# Patient Record
Sex: FEMALE | Race: WHITE | NOT HISPANIC OR LATINO | Employment: OTHER | URBAN - METROPOLITAN AREA
[De-identification: names, ages, dates, MRNs, and addresses within clinical notes are randomized per-mention and may not be internally consistent; named-entity substitution may affect disease eponyms.]

---

## 2023-03-06 ENCOUNTER — PATIENT OUTREACH (OUTPATIENT)
Dept: CARE COORDINATION | Facility: CLINIC | Age: 68
End: 2023-03-06
Payer: MEDICARE

## 2023-03-06 DIAGNOSIS — N20.0 KIDNEY STONES: ICD-10-CM

## 2023-03-06 NOTE — PROGRESS NOTES
Admission Reason: Kidney stone   Final Discharge Diagnoses: Kidney stone Moderate malnutrition related to chronic disease or condition

## 2023-03-07 ENCOUNTER — PATIENT OUTREACH (OUTPATIENT)
Dept: CARE COORDINATION | Facility: CLINIC | Age: 68
End: 2023-03-07
Payer: MEDICARE

## 2023-03-14 LAB
ANION GAP IN SER/PLAS: 9 MMOL/L (ref 10–20)
BASOPHILS (10*3/UL) IN BLOOD BY AUTOMATED COUNT: 0.02 X10E9/L (ref 0–0.1)
BASOPHILS/100 LEUKOCYTES IN BLOOD BY AUTOMATED COUNT: 0.5 % (ref 0–2)
C REACTIVE PROTEIN (MG/L) IN SER/PLAS: 0.43 MG/DL
C. DIFFICILE TOXIN, PCR: NOT DETECTED
CALCIUM (MG/DL) IN SER/PLAS: 9 MG/DL (ref 8.6–10.3)
CARBON DIOXIDE, TOTAL (MMOL/L) IN SER/PLAS: 31 MMOL/L (ref 21–32)
CHLORIDE (MMOL/L) IN SER/PLAS: 103 MMOL/L (ref 98–107)
CREATININE (MG/DL) IN SER/PLAS: 0.46 MG/DL (ref 0.5–1.05)
EOSINOPHILS (10*3/UL) IN BLOOD BY AUTOMATED COUNT: 0.03 X10E9/L (ref 0–0.7)
EOSINOPHILS/100 LEUKOCYTES IN BLOOD BY AUTOMATED COUNT: 0.7 % (ref 0–6)
ERYTHROCYTE DISTRIBUTION WIDTH (RATIO) BY AUTOMATED COUNT: 12 % (ref 11.5–14.5)
ERYTHROCYTE MEAN CORPUSCULAR HEMOGLOBIN CONCENTRATION (G/DL) BY AUTOMATED: 31.9 G/DL (ref 32–36)
ERYTHROCYTE MEAN CORPUSCULAR VOLUME (FL) BY AUTOMATED COUNT: 99 FL (ref 80–100)
ERYTHROCYTES (10*6/UL) IN BLOOD BY AUTOMATED COUNT: 3.52 X10E12/L (ref 4–5.2)
GFR FEMALE: >90 ML/MIN/1.73M2
GLUCOSE (MG/DL) IN SER/PLAS: 84 MG/DL (ref 74–99)
HEMATOCRIT (%) IN BLOOD BY AUTOMATED COUNT: 34.8 % (ref 36–46)
HEMOGLOBIN (G/DL) IN BLOOD: 11.1 G/DL (ref 12–16)
IMMATURE GRANULOCYTES/100 LEUKOCYTES IN BLOOD BY AUTOMATED COUNT: 0.5 % (ref 0–0.9)
LEUKOCYTES (10*3/UL) IN BLOOD BY AUTOMATED COUNT: 4.3 X10E9/L (ref 4.4–11.3)
LYMPHOCYTES (10*3/UL) IN BLOOD BY AUTOMATED COUNT: 1.38 X10E9/L (ref 1.2–4.8)
LYMPHOCYTES/100 LEUKOCYTES IN BLOOD BY AUTOMATED COUNT: 32.2 % (ref 13–44)
MONOCYTES (10*3/UL) IN BLOOD BY AUTOMATED COUNT: 0.55 X10E9/L (ref 0.1–1)
MONOCYTES/100 LEUKOCYTES IN BLOOD BY AUTOMATED COUNT: 12.9 % (ref 2–10)
NEUTROPHILS (10*3/UL) IN BLOOD BY AUTOMATED COUNT: 2.28 X10E9/L (ref 1.2–7.7)
NEUTROPHILS/100 LEUKOCYTES IN BLOOD BY AUTOMATED COUNT: 53.2 % (ref 40–80)
NRBC (PER 100 WBCS) BY AUTOMATED COUNT: 0 /100 WBC (ref 0–0)
PLATELETS (10*3/UL) IN BLOOD AUTOMATED COUNT: 188 X10E9/L (ref 150–450)
POTASSIUM (MMOL/L) IN SER/PLAS: 3.4 MMOL/L (ref 3.5–5.3)
SEDIMENTATION RATE, ERYTHROCYTE: 9 MM/H (ref 0–30)
SODIUM (MMOL/L) IN SER/PLAS: 140 MMOL/L (ref 136–145)
STOOL CULTURE, TEST OF CURE: NORMAL
UREA NITROGEN (MG/DL) IN SER/PLAS: 16 MG/DL (ref 6–23)

## 2023-03-15 LAB
CAMPYLOBACTER GP: NOT DETECTED
NOROVIRUS GI/GII: NOT DETECTED
ROTAVIRUS A: NOT DETECTED
SALMONELLA SP.: NOT DETECTED
SHIGA TOXIN 1: NOT DETECTED
SHIGA TOXIN 2: NOT DETECTED
SHIGELLA SP.: NOT DETECTED
VIBRIO GRP.: NOT DETECTED
YERSINIA ENTEROCOLITICA: NOT DETECTED

## 2023-03-20 LAB
CRYPTOSPORIDIUM ANTIGEN-DATA CONVERSION: NEGATIVE
GIARDIA LAMBLIA AG-DATA CONVERSION: NEGATIVE
OVA + PARASITE EXAM: NEGATIVE

## 2023-05-12 ENCOUNTER — PATIENT OUTREACH (OUTPATIENT)
Dept: CARE COORDINATION | Facility: CLINIC | Age: 68
End: 2023-05-12
Payer: MEDICARE

## 2023-06-01 LAB
ANION GAP IN SER/PLAS: 9 MMOL/L (ref 10–20)
CALCIUM (MG/DL) IN SER/PLAS: 8.9 MG/DL (ref 8.6–10.3)
CARBON DIOXIDE, TOTAL (MMOL/L) IN SER/PLAS: 30 MMOL/L (ref 21–32)
CHLORIDE (MMOL/L) IN SER/PLAS: 103 MMOL/L (ref 98–107)
CREATININE (MG/DL) IN SER/PLAS: 0.45 MG/DL (ref 0.5–1.05)
GFR FEMALE: >90 ML/MIN/1.73M2
GLUCOSE (MG/DL) IN SER/PLAS: 81 MG/DL (ref 74–99)
POTASSIUM (MMOL/L) IN SER/PLAS: 3.9 MMOL/L (ref 3.5–5.3)
SODIUM (MMOL/L) IN SER/PLAS: 138 MMOL/L (ref 136–145)
UREA NITROGEN (MG/DL) IN SER/PLAS: 19 MG/DL (ref 6–23)

## 2023-06-15 LAB
AMPHETAMINE (PRESENCE) IN URINE BY SCREEN METHOD: ABNORMAL
BARBITURATES PRESENCE IN URINE BY SCREEN METHOD: ABNORMAL
BENZODIAZEPINE (PRESENCE) IN URINE BY SCREEN METHOD: ABNORMAL
CANNABINOIDS IN URINE BY SCREEN METHOD: ABNORMAL
COCAINE (PRESENCE) IN URINE BY SCREEN METHOD: ABNORMAL
DRUG SCREEN COMMENT URINE: ABNORMAL
FENTANYL URINE: ABNORMAL
METHADONE (PRESENCE) IN URINE BY SCREEN METHOD: ABNORMAL
OPIATES (PRESENCE) IN URINE BY SCREEN METHOD: ABNORMAL
OXYCODONE (PRESENCE) IN URINE BY SCREEN METHOD: ABNORMAL
PHENCYCLIDINE (PRESENCE) IN URINE BY SCREEN METHOD: ABNORMAL

## 2023-06-19 LAB
7-AMINOCLONAZEPAM: <25 NG/ML
ALPHA-HYDROXYALPRAZOLAM: <25 NG/ML
ALPHA-HYDROXYMIDAZOLAM: <25 NG/ML
ALPRAZOLAM: <25 NG/ML
CHLORDIAZEPOXIDE: <25 NG/ML
CLONAZEPAM: <25 NG/ML
DIAZEPAM: <25 NG/ML
LORAZEPAM: <25 NG/ML
MIDAZOLAM: <25 NG/ML
NORDIAZEPAM: >1000 NG/ML
OXAZEPAM: >1000 NG/ML
TEMAZEPAM: >1000 NG/ML

## 2023-10-03 ENCOUNTER — LAB (OUTPATIENT)
Dept: LAB | Facility: LAB | Age: 68
End: 2023-10-03
Payer: MEDICARE

## 2023-10-03 DIAGNOSIS — K52.832 LYMPHOCYTIC COLITIS: Primary | ICD-10-CM

## 2023-10-03 LAB
ALBUMIN SERPL BCP-MCNC: 4 G/DL (ref 3.4–5)
ALP SERPL-CCNC: 79 U/L (ref 33–136)
ALT SERPL W P-5'-P-CCNC: 10 U/L (ref 7–45)
ANION GAP SERPL CALC-SCNC: 8 MMOL/L (ref 10–20)
AST SERPL W P-5'-P-CCNC: 19 U/L (ref 9–39)
BILIRUB SERPL-MCNC: 0.5 MG/DL (ref 0–1.2)
BUN SERPL-MCNC: 17 MG/DL (ref 6–23)
CALCIUM SERPL-MCNC: 9 MG/DL (ref 8.6–10.3)
CHLORIDE SERPL-SCNC: 102 MMOL/L (ref 98–107)
CO2 SERPL-SCNC: 33 MMOL/L (ref 21–32)
CREAT SERPL-MCNC: 0.56 MG/DL (ref 0.5–1.05)
CRP SERPL-MCNC: 0.17 MG/DL
ERYTHROCYTE [DISTWIDTH] IN BLOOD BY AUTOMATED COUNT: 12.7 % (ref 11.5–14.5)
ERYTHROCYTE [SEDIMENTATION RATE] IN BLOOD BY WESTERGREN METHOD: 32 MM/H (ref 0–30)
GFR SERPL CREATININE-BSD FRML MDRD: >90 ML/MIN/1.73M*2
GLUCOSE SERPL-MCNC: 82 MG/DL (ref 74–99)
HCT VFR BLD AUTO: 34.9 % (ref 36–46)
HGB BLD-MCNC: 11 G/DL (ref 12–16)
MCH RBC QN AUTO: 31.4 PG (ref 26–34)
MCHC RBC AUTO-ENTMCNC: 31.5 G/DL (ref 32–36)
MCV RBC AUTO: 100 FL (ref 80–100)
NRBC BLD-RTO: 0 /100 WBCS (ref 0–0)
PLATELET # BLD AUTO: 165 X10*3/UL (ref 150–450)
PMV BLD AUTO: 9.5 FL (ref 7.5–11.5)
POTASSIUM SERPL-SCNC: 4.1 MMOL/L (ref 3.5–5.3)
PROT SERPL-MCNC: 6.9 G/DL (ref 6.4–8.2)
RBC # BLD AUTO: 3.5 X10*6/UL (ref 4–5.2)
SODIUM SERPL-SCNC: 139 MMOL/L (ref 136–145)
WBC # BLD AUTO: 4.1 X10*3/UL (ref 4.4–11.3)

## 2023-10-03 PROCEDURE — 36415 COLL VENOUS BLD VENIPUNCTURE: CPT

## 2023-10-30 PROBLEM — R76.0 ANTINUCLEAR ANTIBODY (ANA) TITER GREATER THAN 1:80: Status: ACTIVE | Noted: 2023-10-30

## 2023-10-30 PROBLEM — M70.22 OLECRANON BURSITIS OF LEFT ELBOW: Status: ACTIVE | Noted: 2023-10-30

## 2023-10-30 PROBLEM — N89.8 VAGINAL LESION: Status: ACTIVE | Noted: 2023-10-30

## 2023-10-30 PROBLEM — M54.30 CHRONIC SCIATICA: Status: ACTIVE | Noted: 2023-10-30

## 2023-10-30 PROBLEM — H00.022 HORDEOLUM INTERNUM OF RIGHT LOWER EYELID: Status: ACTIVE | Noted: 2023-10-30

## 2023-10-30 PROBLEM — K58.0 IRRITABLE BOWEL SYNDROME WITH DIARRHEA: Status: ACTIVE | Noted: 2023-10-30

## 2023-10-30 PROBLEM — F41.1 GAD (GENERALIZED ANXIETY DISORDER): Status: RESOLVED | Noted: 2023-10-30 | Resolved: 2023-10-30

## 2023-10-30 PROBLEM — H52.203 ASTIGMATISM, BILATERAL: Status: ACTIVE | Noted: 2023-10-30

## 2023-10-30 PROBLEM — H52.4 PRESBYOPIA OF BOTH EYES: Status: ACTIVE | Noted: 2023-10-30

## 2023-10-30 PROBLEM — J18.9 MULTIFOCAL PNEUMONIA: Status: ACTIVE | Noted: 2023-10-30

## 2023-10-30 PROBLEM — H43.811 PVD (POSTERIOR VITREOUS DETACHMENT), RIGHT EYE: Status: ACTIVE | Noted: 2023-10-30

## 2023-10-30 PROBLEM — R79.89 LOW SERUM CORTISOL LEVEL: Status: ACTIVE | Noted: 2023-10-30

## 2023-10-30 PROBLEM — H25.10 NUCLEAR SCLEROSIS: Status: ACTIVE | Noted: 2023-10-30

## 2023-10-30 PROBLEM — E78.49 HYPERLIPIDEMIA, FAMILIAL, HIGH LDL: Status: ACTIVE | Noted: 2023-10-30

## 2023-10-30 PROBLEM — F51.01 PRIMARY INSOMNIA: Status: ACTIVE | Noted: 2023-10-30

## 2023-10-30 PROBLEM — F40.248 SITUATIONAL PHOBIA: Status: ACTIVE | Noted: 2023-10-30

## 2023-10-30 PROBLEM — H52.00 HYPEROPIA: Status: ACTIVE | Noted: 2023-10-30

## 2023-10-30 PROBLEM — J47.9 BRONCHIECTASIS (MULTI): Status: ACTIVE | Noted: 2023-10-30

## 2023-10-30 PROBLEM — F41.9 ANXIETY: Status: ACTIVE | Noted: 2023-10-30

## 2023-10-30 PROBLEM — R05.3 CHRONIC COUGH: Status: ACTIVE | Noted: 2023-10-30

## 2023-10-30 PROBLEM — E55.9 VITAMIN D INSUFFICIENCY: Status: ACTIVE | Noted: 2023-10-30

## 2023-10-30 PROBLEM — K52.9 CHRONIC DIARRHEA: Status: ACTIVE | Noted: 2023-10-30

## 2023-10-30 PROBLEM — F41.1 GAD (GENERALIZED ANXIETY DISORDER): Status: ACTIVE | Noted: 2023-10-30

## 2023-10-30 PROBLEM — R63.4 WEIGHT LOSS, ABNORMAL: Status: ACTIVE | Noted: 2023-10-30

## 2023-10-30 PROBLEM — E87.6 HYPOKALEMIA: Status: ACTIVE | Noted: 2023-10-30

## 2023-10-30 RX ORDER — POTASSIUM CHLORIDE 1.5 G/1.58G
POWDER, FOR SOLUTION ORAL
COMMUNITY
Start: 2022-12-19 | End: 2023-11-02 | Stop reason: ALTCHOICE

## 2023-10-30 RX ORDER — POLYMYXIN B SULFATE AND TRIMETHOPRIM 1; 10000 MG/ML; [USP'U]/ML
SOLUTION OPHTHALMIC
COMMUNITY
Start: 2023-05-09 | End: 2023-11-02 | Stop reason: ALTCHOICE

## 2023-10-30 RX ORDER — AMITRIPTYLINE HYDROCHLORIDE 10 MG/1
10 TABLET, FILM COATED ORAL NIGHTLY
COMMUNITY
Start: 2023-01-16

## 2023-10-30 RX ORDER — ONDANSETRON 4 MG/1
TABLET, ORALLY DISINTEGRATING ORAL
COMMUNITY
End: 2023-11-02 | Stop reason: ALTCHOICE

## 2023-10-30 RX ORDER — CHOLESTYRAMINE 4 G/9G
POWDER, FOR SUSPENSION ORAL
COMMUNITY
Start: 2022-10-20 | End: 2023-11-02 | Stop reason: ALTCHOICE

## 2023-10-30 RX ORDER — LANOLIN ALCOHOL/MO/W.PET/CERES
1 CREAM (GRAM) TOPICAL DAILY
COMMUNITY
Start: 2022-12-19 | End: 2023-11-02 | Stop reason: ALTCHOICE

## 2023-10-30 RX ORDER — SERTRALINE HYDROCHLORIDE 50 MG/1
TABLET, FILM COATED ORAL
COMMUNITY
Start: 2022-12-08 | End: 2023-11-02 | Stop reason: ALTCHOICE

## 2023-10-30 RX ORDER — BUDESONIDE 3 MG/1
CAPSULE, COATED PELLETS ORAL
COMMUNITY
End: 2023-11-02 | Stop reason: ALTCHOICE

## 2023-10-30 RX ORDER — OMEPRAZOLE 20 MG/1
CAPSULE, DELAYED RELEASE ORAL
COMMUNITY
Start: 2023-01-31 | End: 2023-11-02 | Stop reason: ALTCHOICE

## 2023-10-30 RX ORDER — ESCITALOPRAM OXALATE 5 MG/1
TABLET ORAL
COMMUNITY
End: 2023-11-07 | Stop reason: SDUPTHER

## 2023-10-30 RX ORDER — DIAZEPAM 5 MG/1
TABLET ORAL
COMMUNITY
Start: 2016-02-24 | End: 2023-11-07 | Stop reason: SDUPTHER

## 2023-10-31 ENCOUNTER — OFFICE VISIT (OUTPATIENT)
Dept: OPHTHALMOLOGY | Facility: CLINIC | Age: 68
End: 2023-10-31
Payer: MEDICARE

## 2023-10-31 DIAGNOSIS — H52.223 REGULAR ASTIGMATISM OF BOTH EYES: ICD-10-CM

## 2023-10-31 DIAGNOSIS — H25.13 NUCLEAR SCLEROSIS OF BOTH EYES: ICD-10-CM

## 2023-10-31 DIAGNOSIS — H52.4 HYPEROPIA OF BOTH EYES WITH ASTIGMATISM AND PRESBYOPIA: ICD-10-CM

## 2023-10-31 DIAGNOSIS — H52.03 HYPEROPIA OF BOTH EYES WITH ASTIGMATISM AND PRESBYOPIA: ICD-10-CM

## 2023-10-31 DIAGNOSIS — R76.0 ANTINUCLEAR ANTIBODY (ANA) TITER GREATER THAN 1:80: Primary | ICD-10-CM

## 2023-10-31 DIAGNOSIS — H52.203 HYPEROPIA OF BOTH EYES WITH ASTIGMATISM AND PRESBYOPIA: ICD-10-CM

## 2023-10-31 PROCEDURE — 92015 DETERMINE REFRACTIVE STATE: CPT | Performed by: OPTOMETRIST

## 2023-10-31 PROCEDURE — 92014 COMPRE OPH EXAM EST PT 1/>: CPT | Performed by: OPTOMETRIST

## 2023-10-31 RX ORDER — OFLOXACIN 3 MG/ML
3 SOLUTION/ DROPS OPHTHALMIC DAILY
COMMUNITY
Start: 2023-10-20 | End: 2024-03-14 | Stop reason: ALTCHOICE

## 2023-10-31 ASSESSMENT — CONF VISUAL FIELD
OS_SUPERIOR_NASAL_RESTRICTION: 0
OS_NORMAL: 1
OD_SUPERIOR_NASAL_RESTRICTION: 0
OS_INFERIOR_NASAL_RESTRICTION: 0
OS_INFERIOR_TEMPORAL_RESTRICTION: 0
OD_INFERIOR_TEMPORAL_RESTRICTION: 0
OS_SUPERIOR_TEMPORAL_RESTRICTION: 0
OD_INFERIOR_NASAL_RESTRICTION: 0
OD_NORMAL: 1
OD_SUPERIOR_TEMPORAL_RESTRICTION: 0

## 2023-10-31 ASSESSMENT — VISUAL ACUITY
OD_CC+: -1
OS_BAT_HIGH: 20/40
OD_BAT_HIGH: 20/400
OS_CC: 20/25
OD_CC: 20/40
CORRECTION_TYPE: GLASSES
METHOD: SNELLEN - LINEAR

## 2023-10-31 ASSESSMENT — REFRACTION_WEARINGRX
OS_CYLINDER: -0.75
OD_AXIS: 105
OD_CYLINDER: -1.00
OD_ADD: +2.25
SPECS_TYPE: PAL
OD_SPHERE: +2.50
OS_SPHERE: +2.75
OS_ADD: +2.25
OS_AXIS: 085

## 2023-10-31 ASSESSMENT — TONOMETRY
OS_IOP_MMHG: 16
IOP_METHOD: GOLDMANN APPLANATION
OD_IOP_MMHG: 16

## 2023-10-31 ASSESSMENT — SLIT LAMP EXAM - LIDS
COMMENTS: 1+ BLEPHARITIS
COMMENTS: 1+ BLEPHARITIS

## 2023-10-31 ASSESSMENT — ENCOUNTER SYMPTOMS
NEUROLOGICAL NEGATIVE: 0
EYES NEGATIVE: 1
CONSTITUTIONAL NEGATIVE: 0

## 2023-10-31 ASSESSMENT — REFRACTION_MANIFEST
OS_CYLINDER: -0.75
OS_AXIS: 085
OD_AXIS: 105
OD_SPHERE: +2.25
OS_SPHERE: +2.75
OD_CYLINDER: -1.00

## 2023-10-31 ASSESSMENT — CUP TO DISC RATIO
OD_RATIO: 0.3
OS_RATIO: 0.3

## 2023-10-31 ASSESSMENT — EXTERNAL EXAM - LEFT EYE: OS_EXAM: NORMAL

## 2023-10-31 ASSESSMENT — EXTERNAL EXAM - RIGHT EYE: OD_EXAM: NORMAL

## 2023-10-31 NOTE — PROGRESS NOTES
Visual acuity (VA) corrects to 20/40 right eye (OD) and 20/25 left eye (OS) and was 20/20 right eye (OD) and left eye (OS) last year. Bat visual acuity (VA) 20/400 right eye (OD) and 20/40 left eye (OS). Will refer for cataract pre-op testing.   FBS left eye (OS) and recent history of eye infections. Hx of chalazion right lower eyelid (RLL) last year. Meibomian gland dysfunction (MGD) and recommend to start using iVizia QID and WC Brudermask BID 5 minutes and RTC 1-2 months for all dry eye testing. Note has positive antinuclear antibodies test (MAUREEN) test.

## 2023-11-01 PROBLEM — K52.839 MICROSCOPIC COLITIS: Status: ACTIVE | Noted: 2022-11-29

## 2023-11-02 ENCOUNTER — OFFICE VISIT (OUTPATIENT)
Dept: PRIMARY CARE | Facility: CLINIC | Age: 68
End: 2023-11-02
Payer: MEDICARE

## 2023-11-02 VITALS
TEMPERATURE: 97.2 F | SYSTOLIC BLOOD PRESSURE: 102 MMHG | DIASTOLIC BLOOD PRESSURE: 66 MMHG | BODY MASS INDEX: 16.05 KG/M2 | HEIGHT: 64 IN | WEIGHT: 94 LBS | HEART RATE: 86 BPM

## 2023-11-02 DIAGNOSIS — F41.9 ANXIETY: ICD-10-CM

## 2023-11-02 DIAGNOSIS — Z51.81 MEDICATION MONITORING ENCOUNTER: ICD-10-CM

## 2023-11-02 DIAGNOSIS — K52.839 MICROSCOPIC COLITIS, UNSPECIFIED MICROSCOPIC COLITIS TYPE: Primary | ICD-10-CM

## 2023-11-02 PROCEDURE — 1036F TOBACCO NON-USER: CPT | Performed by: FAMILY MEDICINE

## 2023-11-02 PROCEDURE — 99212 OFFICE O/P EST SF 10 MIN: CPT | Performed by: FAMILY MEDICINE

## 2023-11-02 PROCEDURE — 93000 ELECTROCARDIOGRAM COMPLETE: CPT | Performed by: FAMILY MEDICINE

## 2023-11-02 PROCEDURE — 1126F AMNT PAIN NOTED NONE PRSNT: CPT | Performed by: FAMILY MEDICINE

## 2023-11-02 PROCEDURE — 3008F BODY MASS INDEX DOCD: CPT | Performed by: FAMILY MEDICINE

## 2023-11-02 PROCEDURE — 1160F RVW MEDS BY RX/DR IN RCRD: CPT | Performed by: FAMILY MEDICINE

## 2023-11-02 PROCEDURE — 1159F MED LIST DOCD IN RCRD: CPT | Performed by: FAMILY MEDICINE

## 2023-11-02 ASSESSMENT — PATIENT HEALTH QUESTIONNAIRE - PHQ9
2. FEELING DOWN, DEPRESSED OR HOPELESS: NOT AT ALL
1. LITTLE INTEREST OR PLEASURE IN DOING THINGS: NOT AT ALL
SUM OF ALL RESPONSES TO PHQ9 QUESTIONS 1 AND 2: 0

## 2023-11-04 NOTE — PROGRESS NOTES
"Subjective   Patient ID: Marcia Steinberg is a 68 y.o. female who presents for Follow-up (Patient is here to have an EKG done due to possible interaction with medications.).    HPI   Here today to have a EKG obtained due to concurrent use of amitriptyline and escitalopram.  Amitriptyline was started by her gastroenterologist to see if this helped with her colitis symptoms.  Continues to struggle with controlling colitis symptoms that she has frequent loose, watery stool, continued difficulties with weight gain/weight maintenance.  Stable on current Lexapro 5 mg daily and as needed diazepam to help with anxiety symptoms.  Following with psychiatric nurse practitioner  Denies chest pains, heart palpitations, lightheadedness, dizziness.  Review of Systems  Per HPI  Objective   /66 (BP Location: Left arm, Patient Position: Sitting, BP Cuff Size: Adult)   Pulse 86   Temp 36.2 °C (97.2 °F)   Ht 1.626 m (5' 4\")   Wt (!) 42.6 kg (94 lb)   BMI 16.14 kg/m²     Physical Exam  Constitutional:       Appearance: She is underweight.   HENT:      Mouth/Throat:      Mouth: Mucous membranes are moist.      Pharynx: Oropharynx is clear.   Eyes:      Extraocular Movements: Extraocular movements intact.      Conjunctiva/sclera: Conjunctivae normal.   Cardiovascular:      Rate and Rhythm: Normal rate and regular rhythm.      Pulses: Normal pulses.      Heart sounds: Normal heart sounds.   Pulmonary:      Effort: Pulmonary effort is normal.      Breath sounds: Normal breath sounds.   Abdominal:      General: Abdomen is flat. Bowel sounds are normal.      Palpations: Abdomen is soft.   Musculoskeletal:      Cervical back: Normal range of motion and neck supple.      Right lower leg: No edema.      Left lower leg: No edema.   Neurological:      Mental Status: She is alert.         Assessment/Plan   Diagnoses and all orders for this visit:  Reviewed EKG with patient.  At present, acceptable to continue treatment with both " amitriptyline and Lexapro.  Reviewed however, she should avoid adding additional medications that could prolong QTc interval such as antibiotics like Zithromax or quinolones.  Advised to monitor and seek evaluation if she notes any increased palpitations, lightheadedness, dizziness.  Microscopic colitis, unspecified microscopic colitis type  -     ECG 12 lead (Clinic Performed)  Anxiety  -     ECG 12 lead (Clinic Performed)  Medication monitoring encounter  -     ECG 12 lead (Clinic Performed)

## 2023-11-07 ENCOUNTER — OFFICE VISIT (OUTPATIENT)
Dept: BEHAVIORAL HEALTH | Facility: CLINIC | Age: 68
End: 2023-11-07
Payer: MEDICARE

## 2023-11-07 VITALS
SYSTOLIC BLOOD PRESSURE: 124 MMHG | WEIGHT: 94 LBS | DIASTOLIC BLOOD PRESSURE: 72 MMHG | BODY MASS INDEX: 15.66 KG/M2 | HEART RATE: 76 BPM | HEIGHT: 65 IN

## 2023-11-07 DIAGNOSIS — F41.1 GAD (GENERALIZED ANXIETY DISORDER): ICD-10-CM

## 2023-11-07 PROCEDURE — 99214 OFFICE O/P EST MOD 30 MIN: CPT | Performed by: CLINICAL NURSE SPECIALIST

## 2023-11-07 PROCEDURE — 1159F MED LIST DOCD IN RCRD: CPT | Performed by: CLINICAL NURSE SPECIALIST

## 2023-11-07 PROCEDURE — 1160F RVW MEDS BY RX/DR IN RCRD: CPT | Performed by: CLINICAL NURSE SPECIALIST

## 2023-11-07 PROCEDURE — 3008F BODY MASS INDEX DOCD: CPT | Performed by: CLINICAL NURSE SPECIALIST

## 2023-11-07 PROCEDURE — 1036F TOBACCO NON-USER: CPT | Performed by: CLINICAL NURSE SPECIALIST

## 2023-11-07 PROCEDURE — 1126F AMNT PAIN NOTED NONE PRSNT: CPT | Performed by: CLINICAL NURSE SPECIALIST

## 2023-11-07 RX ORDER — ESCITALOPRAM OXALATE 5 MG/1
TABLET ORAL
Qty: 90 TABLET | Refills: 0 | Status: SHIPPED | OUTPATIENT
Start: 2023-11-07 | End: 2024-02-20 | Stop reason: DRUGHIGH

## 2023-11-07 RX ORDER — DIAZEPAM 5 MG/1
TABLET ORAL
Qty: 30 TABLET | Refills: 2 | Status: SHIPPED | OUTPATIENT
Start: 2023-11-07 | End: 2024-01-09 | Stop reason: SDUPTHER

## 2023-11-07 NOTE — PROGRESS NOTES
Outpatient Psychiatry      Subjective   Marcia Steinberg, a 68 y.o. female, presents  for established patient appointment for outpatient psychiatry /medication management for an in person appointment     Assessment/Plan   Can continue medication and treatment and adhere to treatment and engage in self care and wellness efforts to support mental health     Diagnosis:  ·   SHANTEL (generalized anxiety disorder) (300.02) (F41.1)     Patient Active Problem List   Diagnosis    Antinuclear antibody (MAUREEN) titer greater than 1:80    Anxiety    Astigmatism, bilateral    Bronchiectasis (CMS/HCC)    Chronic cough    Chronic sciatica    Hordeolum internum of right lower eyelid    Hyperlipidemia, familial, high LDL    Hyperopia    Hypokalemia    Chronic diarrhea    Irritable bowel syndrome with diarrhea    Low serum cortisol level    Multifocal pneumonia    Nuclear sclerosis    Olecranon bursitis of left elbow    Presbyopia of both eyes    Primary insomnia    PVD (posterior vitreous detachment), right eye    Situational phobia    Vaginal lesion    Vitamin D insufficiency    Weight loss, abnormal    Microscopic colitis     Treatment Goals:  Specify outcomes written in observable, behavioral terms:   Continue self care and wellness components to support mental health and maintain stability , and continue treatment with supportive care and medication management on a regular basis     Treatment Plan/Recommendations:   8 weeks follow up , can continue self care and wellness efforts and maintain routine health screenings , can call  for treatment and scheduling concerns   Follow-up plan was discussed with patient.    Review with patient: Treatment plan reviewed with the patient.  Medication risks/benefit reviewed with the patient    History of Present Illness  patient reports anxiety is improved , no longer having panic attacks   she has seen her pcp for assessment of weight loss and she has had loose stools   she had a trial  of sertraline for anxiety and says this made her nauseous , she is tolerating escitalopram , she can continue the escitalopram in the morning , she is also taking amitryptyline at bedtime as this is prescribed for gi issues , per her report , this factors into the decision , for keeping the diazepam for now , though explained it is a controlled med with a risk of physical dependence , falls risks , memory risks and fatigue and may have increased sedation with taking it with amitryptyline   she reports with taking diazepam she is able to feel rested and gets some sleep   denies any substance abuse concerns , she does not use any illicit drugs , no alcohol, no cbd or thc products used   no psychoses   thoughts are without thought blocking , are not tangential and are organized   there are no ocd like behaviors   she does not have any paranoid or delusional thoughts , she is not having any recent hallucinations   no history of koko or hypomania   she stays active , her roger is important to her , she lives with her  whom is supportive to her      I have personally reviewed the OARRS report for STEPHANY BEDOLLA. I have considered the risks of abuse, dependence, addiction and diversion.   I have the following concerns: no concerns on oarrs.   Is the patient prescribed a combination of a benzodiazepine and opioid? No.   Last urine drug screening date: 6/15/23 results as expected , no concerns   Results of last screen: Results as expected.   Date of the last Controlled Substance Agreement: 6/13/23   BENZODIAZEPINES   What is the patient's goal of therapy? to treat shantel and manage intense anxiety.  Is this being achieved with current treatment? yes , she reports improved anxiety.   SHANTEL-7   1. Feeling nervous, anxious or on edge- not at all  2. Not being able to stop or control worrying - not at all  3. Worrying too much about different things - several days  4. Trouble relaxing - not at all  5. Being so restless that  it is hard to sit still - not at all  6. Becoming easily annoyed or irritable - not at all  7. Feeling afraid as if something awful might happen - not at all  Total Score = 1  Activities of Daily Living:   Yes, it is my opinion that this patient is benefitting from benzodiazepine therapy.   Physical functioning: Better   Family relationships: Better   Social relationships: Better   Mood: Better   Sleep patterns: Better   Overall functioning: Better   Current or Past Use of Non-Controlled Medication: Serotonin Reuptake Inhibitor (SSRI).       Current Medications:   · Renew: Escitalopram Oxalate 5 MG Oral Tablet; 1 tablet daily each morning     · Renew: diazePAM 5 MG Oral Tablet; TAKE 1 TABLET BY MOUTH EACH EVENING    Current Outpatient Medications:     amitriptyline (Elavil) 10 mg tablet, Take 1 tablet (10 mg) by mouth once daily at bedtime., Disp: , Rfl:     diazePAM (Valium) 5 mg tablet, TAKE 1 TABLET BY MOUTH EACH EVENING, Disp: , Rfl:     escitalopram (Lexapro) 5 mg tablet, 1 TABLET DAILY EACH MORNING, Disp: , Rfl:     ofloxacin (Ocuflox) 0.3 % ophthalmic solution, Administer 3 drops into the left eye once daily., Disp: , Rfl:     vedolizumab (Entyvio) 300 mg injection, 300 mg week 0, 2, 6, then every 8 weeks, Disp: , Rfl:   Medical History:  Past Medical History:   Diagnosis Date    Acute recurrent pansinusitis 01/31/2017    Acute recurrent pansinusitis    Adhesive capsulitis of right shoulder 07/06/2018    Adhesive capsulitis of right shoulder    Low back pain, unspecified 10/16/2014    Chronic lower back pain    Low back pain, unspecified 07/11/2019    Chronic low back pain    Olecranon bursitis, left elbow 12/05/2019    Olecranon bursitis of left elbow    Pain in right shoulder 07/11/2019    Chronic right shoulder pain    Pain in right shoulder 03/26/2018    Shoulder pain, right    Personal history of other diseases of the respiratory system 12/16/2015    History of acute pharyngitis    Personal history of  other drug therapy 12/05/2019    History of influenza vaccination    Primary osteoarthritis, right shoulder 09/13/2017    Arthritis of right acromioclavicular joint    Sciatica, left side 11/14/2017    Chronic sciatica of left side    Segmental and somatic dysfunction of upper extremity 07/11/2019    Segmental and somatic dysfunction of upper extremity     Surgical History:  Past Surgical History:   Procedure Laterality Date    CHOLECYSTECTOMY  10/16/2014    Cholecystectomy    CT ABDOMEN PELVIS ANGIOGRAM W AND/OR WO IV CONTRAST  3/1/2023    CT ABDOMEN PELVIS ANGIOGRAM W AND/OR WO IV CONTRAST [facility]    OTHER SURGICAL HISTORY  12/08/2022    Tonsillectomy    OTHER SURGICAL HISTORY  12/21/2022    Colonoscopy     Family History:  Family History   Problem Relation Name Age of Onset    Cataracts Mother      Cataracts Father      Other (long term exposure to asbestos) Father       Social History:  Social History     Socioeconomic History    Marital status:      Spouse name: Not on file    Number of children: Not on file    Years of education: Not on file    Highest education level: Not on file   Occupational History    Not on file   Tobacco Use    Smoking status: Never    Smokeless tobacco: Never   Substance and Sexual Activity    Alcohol use: Never    Drug use: Never    Sexual activity: Not on file   Other Topics Concern    Not on file   Social History Narrative    Not on file     Social Determinants of Health     Financial Resource Strain: Not on file   Food Insecurity: Not on file   Transportation Needs: Not on file   Physical Activity: Not on file   Stress: Not on file   Social Connections: Not on file   Intimate Partner Violence: Not on file   Housing Stability: Not on file     Record Review: brief     Medical Review Of Systems:  Pertinent items are noted in HPI.    Psychiatric Review Of Systems:  Depressive Symptoms:N/A  Manic Symptoms:none   Anxiety Symptoms:none   Disordered Eating Symptoms:Other: (comment)  none   Inattentive Symptoms:Other: (comment) none     Hyperactive/Impulsive Symptoms:Other: (comment) none   Oppositional Defiant Symptoms:Other: (comment) none   Trauma Symptoms:none   Conduct Issues:Other: (comment) none   Psychotic Symptoms:Other: (comment) none   Developmental Concerns:none   Other Symptoms/Concerns:none   Delirium/Altered Mental Status Symptoms:none      Objective   Mental Status Exam:     Other Objective Information:  None   Vitals:  Vitals:    11/07/23 1331   BP: 124/72   Pulse: 76     Bonita Angulo, APRN-CNS

## 2023-11-14 ENCOUNTER — APPOINTMENT (OUTPATIENT)
Dept: OPHTHALMOLOGY | Facility: CLINIC | Age: 68
End: 2023-11-14
Payer: MEDICARE

## 2023-12-19 ENCOUNTER — APPOINTMENT (OUTPATIENT)
Dept: OPHTHALMOLOGY | Facility: CLINIC | Age: 68
End: 2023-12-19
Payer: MEDICARE

## 2023-12-21 ENCOUNTER — TELEPHONE (OUTPATIENT)
Dept: OTHER | Age: 68
End: 2023-12-21
Payer: MEDICARE

## 2023-12-21 NOTE — TELEPHONE ENCOUNTER
Patient calling in regarding Lexapro 5mg.     Last couple weeks, anxiety in evening has been increasing. Wondering about increasing dosage.     Recommended making appointment, but asking for phone call when possible?

## 2023-12-27 ENCOUNTER — TELEPHONE (OUTPATIENT)
Dept: PRIMARY CARE | Facility: CLINIC | Age: 68
End: 2023-12-27
Payer: MEDICARE

## 2023-12-27 NOTE — TELEPHONE ENCOUNTER
Patient stated that she was going through old text messages and found one that states she needed to have an echocardiogram done.  Would like to know if she still needs to have the echo done.  Please advise.

## 2024-01-04 NOTE — TELEPHONE ENCOUNTER
Telephone call to patient, spoke with patient, informed her that Dr. Boo mentioned her having an echo done in the office in 2021 which was normal. I asked the patient if she remembers where or who the message was from, patient states she is not sure but does not think she needs one done at this time.

## 2024-01-09 ENCOUNTER — OFFICE VISIT (OUTPATIENT)
Dept: BEHAVIORAL HEALTH | Facility: CLINIC | Age: 69
End: 2024-01-09
Payer: MEDICARE

## 2024-01-09 DIAGNOSIS — F41.1 GAD (GENERALIZED ANXIETY DISORDER): ICD-10-CM

## 2024-01-09 PROCEDURE — 99213 OFFICE O/P EST LOW 20 MIN: CPT | Performed by: CLINICAL NURSE SPECIALIST

## 2024-01-09 PROCEDURE — 1036F TOBACCO NON-USER: CPT | Performed by: CLINICAL NURSE SPECIALIST

## 2024-01-09 PROCEDURE — 1159F MED LIST DOCD IN RCRD: CPT | Performed by: CLINICAL NURSE SPECIALIST

## 2024-01-09 PROCEDURE — 1126F AMNT PAIN NOTED NONE PRSNT: CPT | Performed by: CLINICAL NURSE SPECIALIST

## 2024-01-09 PROCEDURE — 3008F BODY MASS INDEX DOCD: CPT | Performed by: CLINICAL NURSE SPECIALIST

## 2024-01-09 RX ORDER — ESCITALOPRAM OXALATE 10 MG/1
10 TABLET ORAL DAILY
Qty: 90 TABLET | Refills: 1 | Status: SHIPPED | OUTPATIENT
Start: 2024-01-09 | End: 2024-03-19 | Stop reason: SDUPTHER

## 2024-01-09 RX ORDER — DIAZEPAM 5 MG/1
TABLET ORAL
Qty: 30 TABLET | Refills: 1 | Status: SHIPPED | OUTPATIENT
Start: 2024-01-09 | End: 2024-03-19 | Stop reason: SDUPTHER

## 2024-01-09 NOTE — PROGRESS NOTES
Outpatient Psychiatry      Subjective   Marcia Steinberg, a 68 y.o. female, for   presents  for established patient appointment for outpatient psychiatry /medication management for an in person appointment     Diagnoses :  Generalized anxiety disorder     Patient Active Problem List   Diagnosis    Antinuclear antibody (MAUREEN) titer greater than 1:80    Anxiety    Astigmatism, bilateral    Bronchiectasis (CMS/HCC)    Chronic cough    Chronic sciatica    Hordeolum internum of right lower eyelid    Hyperlipidemia, familial, high LDL    Hyperopia    Hypokalemia    Chronic diarrhea    Irritable bowel syndrome with diarrhea    Low serum cortisol level    Multifocal pneumonia    Nuclear sclerosis    Olecranon bursitis of left elbow    Presbyopia of both eyes    Primary insomnia    PVD (posterior vitreous detachment), right eye    Situational phobia    Vaginal lesion    Vitamin D insufficiency    Weight loss, abnormal    Microscopic colitis       Treatment Goals:  Specify outcomes written in observable, behavioral terms:   Maintain stability of mental health and adhere to treatment     Treatment Plan/Recommendations: 10-12 weeks follow up , can continue self care and wellness efforts and maintain routine health screenings , can call  for treatment and scheduling concerns   Follow-up plan for depression was discussed with patient.      Review with patient: Treatment plan reviewed with the patient.  Medication risks/benefit reviewed with the patient    HPI:  patient reports anxiety is improved , no longer having panic attacks   she has seen her pcp for assessment of weight loss and she has had loose stools   she reports with taking diazepam she is able to feel rested and gets some sleep   denies any substance abuse concerns , she does not use any illicit drugs , no alcohol, no cbd or thc products used   no psychoses   thoughts are without thought blocking , are not tangential and are organized   there are no ocd like  behaviors   she does not have any paranoid or delusional thoughts , she is not having any recent hallucinations   no history of koko or hypomania   she stays active , her roger is important to her , she lives with her  whom is supportive to her      I have personally reviewed the OARRS report for STEPHANY BEDOLLA. I have considered the risks of abuse, dependence, addiction and diversion.   I have the following concerns: no concerns on oarrs.   Is the patient prescribed a combination of a benzodiazepine and opioid? No.   Last urine drug screening date: 6/15/23 results as expected , no concerns   Results of last screen: Results as expected.   Date of the last Controlled Substance Agreement: 6/13/23   BENZODIAZEPINES   What is the patient's goal of therapy? to treat tay and manage intense anxiety.  Is this being achieved with current treatment? yes , she reports improved anxiety.    it is my opinion that this patient is benefitting from benzodiazepine therapy.   Physical functioning: Better   Family relationships: Better   Social relationships: Better   Mood: Better   Sleep patterns: Better   Overall functioning: Better   Current or Past Use of Non-Controlled Medication: Serotonin Reuptake Inhibitor (SSRI).    Current Outpatient Medications:     amitriptyline (Elavil) 10 mg tablet, Take 1 tablet (10 mg) by mouth once daily at bedtime., Disp: , Rfl:     diazePAM (Valium) 5 mg tablet, TAKE 1 TABLET BY MOUTH EACH EVENING, Disp: 30 tablet, Rfl: 2    escitalopram (Lexapro) 5 mg tablet, 1 TABLET DAILY EACH MORNING, Disp: 90 tablet, Rfl: 0    ofloxacin (Ocuflox) 0.3 % ophthalmic solution, Administer 3 drops into the left eye once daily., Disp: , Rfl:     vedolizumab (Entyvio) 300 mg injection, 300 mg week 0, 2, 6, then every 8 weeks, Disp: , Rfl:   Medical History:  Past Medical History:   Diagnosis Date    Acute recurrent pansinusitis 01/31/2017    Acute recurrent pansinusitis    Adhesive capsulitis of right shoulder  07/06/2018    Adhesive capsulitis of right shoulder    Low back pain, unspecified 10/16/2014    Chronic lower back pain    Low back pain, unspecified 07/11/2019    Chronic low back pain    Olecranon bursitis, left elbow 12/05/2019    Olecranon bursitis of left elbow    Pain in right shoulder 07/11/2019    Chronic right shoulder pain    Pain in right shoulder 03/26/2018    Shoulder pain, right    Personal history of other diseases of the respiratory system 12/16/2015    History of acute pharyngitis    Personal history of other drug therapy 12/05/2019    History of influenza vaccination    Primary osteoarthritis, right shoulder 09/13/2017    Arthritis of right acromioclavicular joint    Sciatica, left side 11/14/2017    Chronic sciatica of left side    Segmental and somatic dysfunction of upper extremity 07/11/2019    Segmental and somatic dysfunction of upper extremity     Surgical History:  Past Surgical History:   Procedure Laterality Date    CHOLECYSTECTOMY  10/16/2014    Cholecystectomy    CT ABDOMEN PELVIS ANGIOGRAM W AND/OR WO IV CONTRAST  3/1/2023    CT ABDOMEN PELVIS ANGIOGRAM W AND/OR WO IV CONTRAST [facility]    OTHER SURGICAL HISTORY  12/08/2022    Tonsillectomy    OTHER SURGICAL HISTORY  12/21/2022    Colonoscopy     Family History:  Family History   Problem Relation Name Age of Onset    Cataracts Mother      Cataracts Father      Other (long term exposure to asbestos) Father       Social History:      Record Review: brief     Vitals:  There were no vitals filed for this visit.    Bonita Angulo, APRN-CNS

## 2024-01-23 ENCOUNTER — LAB (OUTPATIENT)
Dept: LAB | Facility: LAB | Age: 69
End: 2024-01-23
Payer: MEDICARE

## 2024-01-23 DIAGNOSIS — K52.832 LYMPHOCYTIC COLITIS: Primary | ICD-10-CM

## 2024-01-23 DIAGNOSIS — K52.9 NONINFECTIVE GASTROENTERITIS AND COLITIS, UNSPECIFIED: ICD-10-CM

## 2024-01-23 LAB
ALBUMIN SERPL BCP-MCNC: 3.9 G/DL (ref 3.4–5)
ALP SERPL-CCNC: 77 U/L (ref 33–136)
ALT SERPL W P-5'-P-CCNC: 12 U/L (ref 7–45)
ANION GAP SERPL CALC-SCNC: 7 MMOL/L (ref 10–20)
AST SERPL W P-5'-P-CCNC: 21 U/L (ref 9–39)
BILIRUB SERPL-MCNC: 0.3 MG/DL (ref 0–1.2)
BUN SERPL-MCNC: 18 MG/DL (ref 6–23)
CALCIUM SERPL-MCNC: 9 MG/DL (ref 8.6–10.3)
CHLORIDE SERPL-SCNC: 102 MMOL/L (ref 98–107)
CO2 SERPL-SCNC: 32 MMOL/L (ref 21–32)
CREAT SERPL-MCNC: 0.56 MG/DL (ref 0.5–1.05)
EGFRCR SERPLBLD CKD-EPI 2021: >90 ML/MIN/1.73M*2
ERYTHROCYTE [DISTWIDTH] IN BLOOD BY AUTOMATED COUNT: 12.6 % (ref 11.5–14.5)
ERYTHROCYTE [SEDIMENTATION RATE] IN BLOOD BY WESTERGREN METHOD: 10 MM/H (ref 0–30)
GLUCOSE SERPL-MCNC: 110 MG/DL (ref 74–99)
HCT VFR BLD AUTO: 31.9 % (ref 36–46)
HGB BLD-MCNC: 10 G/DL (ref 12–16)
MCH RBC QN AUTO: 31.7 PG (ref 26–34)
MCHC RBC AUTO-ENTMCNC: 31.3 G/DL (ref 32–36)
MCV RBC AUTO: 101 FL (ref 80–100)
NRBC BLD-RTO: 0 /100 WBCS (ref 0–0)
PLATELET # BLD AUTO: 155 X10*3/UL (ref 150–450)
POTASSIUM SERPL-SCNC: 3.6 MMOL/L (ref 3.5–5.3)
PROT SERPL-MCNC: 7 G/DL (ref 6.4–8.2)
RBC # BLD AUTO: 3.15 X10*6/UL (ref 4–5.2)
SODIUM SERPL-SCNC: 137 MMOL/L (ref 136–145)
WBC # BLD AUTO: 4.3 X10*3/UL (ref 4.4–11.3)

## 2024-01-23 PROCEDURE — 86481 TB AG RESPONSE T-CELL SUSP: CPT

## 2024-01-23 PROCEDURE — 85652 RBC SED RATE AUTOMATED: CPT

## 2024-01-23 PROCEDURE — 36415 COLL VENOUS BLD VENIPUNCTURE: CPT

## 2024-01-23 PROCEDURE — 85027 COMPLETE CBC AUTOMATED: CPT

## 2024-01-23 PROCEDURE — 80053 COMPREHEN METABOLIC PANEL: CPT

## 2024-01-25 DIAGNOSIS — D64.9 ANEMIA, UNSPECIFIED: Primary | ICD-10-CM

## 2024-01-25 LAB
NIL(NEG) CONTROL SPOT COUNT: NORMAL
PANEL A SPOT COUNT: 0
PANEL B SPOT COUNT: 1
POS CONTROL SPOT COUNT: NORMAL
T-SPOT. TB INTERPRETATION: NEGATIVE

## 2024-02-01 ENCOUNTER — LAB (OUTPATIENT)
Dept: LAB | Facility: LAB | Age: 69
End: 2024-02-01
Payer: MEDICARE

## 2024-02-01 DIAGNOSIS — D64.9 ANEMIA, UNSPECIFIED: ICD-10-CM

## 2024-02-01 LAB
FERRITIN SERPL-MCNC: 247 NG/ML (ref 8–150)
IRON SERPL-MCNC: 35 UG/DL (ref 35–150)

## 2024-02-01 PROCEDURE — 82607 VITAMIN B-12: CPT

## 2024-02-01 PROCEDURE — 82728 ASSAY OF FERRITIN: CPT

## 2024-02-01 PROCEDURE — 36415 COLL VENOUS BLD VENIPUNCTURE: CPT

## 2024-02-01 PROCEDURE — 83540 ASSAY OF IRON: CPT

## 2024-02-01 PROCEDURE — 82746 ASSAY OF FOLIC ACID SERUM: CPT

## 2024-02-02 LAB
FOLATE SERPL-MCNC: >24 NG/ML
VIT B12 SERPL-MCNC: 280 PG/ML (ref 211–911)

## 2024-02-08 DIAGNOSIS — K52.9 NONINFECTIVE GASTROENTERITIS AND COLITIS, UNSPECIFIED: ICD-10-CM

## 2024-02-08 DIAGNOSIS — R63.4 ABNORMAL WEIGHT LOSS: Primary | ICD-10-CM

## 2024-02-19 ENCOUNTER — LAB (OUTPATIENT)
Dept: LAB | Facility: LAB | Age: 69
End: 2024-02-19
Payer: MEDICARE

## 2024-02-19 DIAGNOSIS — K52.9 NONINFECTIVE GASTROENTERITIS AND COLITIS, UNSPECIFIED: ICD-10-CM

## 2024-02-19 DIAGNOSIS — R63.4 ABNORMAL WEIGHT LOSS: ICD-10-CM

## 2024-02-19 LAB
IRON SATN MFR SERPL: 19 % (ref 25–45)
IRON SERPL-MCNC: 51 UG/DL (ref 35–150)
TIBC SERPL-MCNC: 272 UG/DL (ref 240–445)
UIBC SERPL-MCNC: 221 UG/DL (ref 110–370)

## 2024-02-19 PROCEDURE — 36415 COLL VENOUS BLD VENIPUNCTURE: CPT

## 2024-02-19 PROCEDURE — 83540 ASSAY OF IRON: CPT

## 2024-02-19 PROCEDURE — 83550 IRON BINDING TEST: CPT

## 2024-02-20 ENCOUNTER — DOCUMENTATION (OUTPATIENT)
Dept: BEHAVIORAL HEALTH | Facility: CLINIC | Age: 69
End: 2024-02-20
Payer: MEDICARE

## 2024-02-20 NOTE — PROGRESS NOTES
Non billable note : discontinued 5 mg daily escitalopram from med list , patient clarified dose is 10 mg , daily , in January we did send a script for 10 mg , daily 90 days and one refill , so she should have enough med , the pharmacy requested a script for 5 mg , daily . Sent message to rn also about this . [facility]pacer cns

## 2024-03-14 ENCOUNTER — OFFICE VISIT (OUTPATIENT)
Dept: PRIMARY CARE | Facility: CLINIC | Age: 69
End: 2024-03-14
Payer: MEDICARE

## 2024-03-14 VITALS
SYSTOLIC BLOOD PRESSURE: 108 MMHG | BODY MASS INDEX: 16.22 KG/M2 | HEART RATE: 82 BPM | TEMPERATURE: 97 F | WEIGHT: 95 LBS | DIASTOLIC BLOOD PRESSURE: 62 MMHG | HEIGHT: 64 IN

## 2024-03-14 DIAGNOSIS — Z78.0 ASYMPTOMATIC MENOPAUSAL STATE: ICD-10-CM

## 2024-03-14 DIAGNOSIS — Z13.89 SCREENING FOR MULTIPLE CONDITIONS: ICD-10-CM

## 2024-03-14 DIAGNOSIS — Z13.6 SCREENING FOR CARDIOVASCULAR CONDITION: ICD-10-CM

## 2024-03-14 DIAGNOSIS — Z71.89 ENCOUNTER FOR CARDIAC RISK COUNSELING: ICD-10-CM

## 2024-03-14 DIAGNOSIS — Z00.00 ROUTINE GENERAL MEDICAL EXAMINATION AT HEALTH CARE FACILITY: Primary | ICD-10-CM

## 2024-03-14 DIAGNOSIS — Z12.31 ENCOUNTER FOR SCREENING MAMMOGRAM FOR BREAST CANCER: ICD-10-CM

## 2024-03-14 DIAGNOSIS — R87.618 UNEXPLAINED ENDOMETRIAL CELLS ON CERVICAL PAP SMEAR: ICD-10-CM

## 2024-03-14 DIAGNOSIS — Z13.1 DIABETES MELLITUS SCREENING: ICD-10-CM

## 2024-03-14 PROBLEM — J47.9 BRONCHIECTASIS (MULTI): Status: RESOLVED | Noted: 2023-10-30 | Resolved: 2024-03-14

## 2024-03-14 PROBLEM — H00.022 HORDEOLUM INTERNUM OF RIGHT LOWER EYELID: Status: RESOLVED | Noted: 2023-10-30 | Resolved: 2024-03-14

## 2024-03-14 PROBLEM — N89.8 VAGINAL LESION: Status: RESOLVED | Noted: 2023-10-30 | Resolved: 2024-03-14

## 2024-03-14 PROBLEM — R79.89 LOW SERUM CORTISOL LEVEL: Status: RESOLVED | Noted: 2023-10-30 | Resolved: 2024-03-14

## 2024-03-14 PROBLEM — J18.9 MULTIFOCAL PNEUMONIA: Status: RESOLVED | Noted: 2023-10-30 | Resolved: 2024-03-14

## 2024-03-14 PROCEDURE — G0444 DEPRESSION SCREEN ANNUAL: HCPCS | Performed by: FAMILY MEDICINE

## 2024-03-14 PROCEDURE — 1123F ACP DISCUSS/DSCN MKR DOCD: CPT | Performed by: FAMILY MEDICINE

## 2024-03-14 PROCEDURE — 1157F ADVNC CARE PLAN IN RCRD: CPT | Performed by: FAMILY MEDICINE

## 2024-03-14 PROCEDURE — 1159F MED LIST DOCD IN RCRD: CPT | Performed by: FAMILY MEDICINE

## 2024-03-14 PROCEDURE — 1160F RVW MEDS BY RX/DR IN RCRD: CPT | Performed by: FAMILY MEDICINE

## 2024-03-14 PROCEDURE — G0439 PPPS, SUBSEQ VISIT: HCPCS | Performed by: FAMILY MEDICINE

## 2024-03-14 PROCEDURE — 1036F TOBACCO NON-USER: CPT | Performed by: FAMILY MEDICINE

## 2024-03-14 PROCEDURE — 3008F BODY MASS INDEX DOCD: CPT | Performed by: FAMILY MEDICINE

## 2024-03-14 PROCEDURE — 1170F FXNL STATUS ASSESSED: CPT | Performed by: FAMILY MEDICINE

## 2024-03-14 ASSESSMENT — ACTIVITIES OF DAILY LIVING (ADL)
TAKING_MEDICATION: INDEPENDENT
BATHING: INDEPENDENT
GROCERY_SHOPPING: INDEPENDENT
DRESSING: INDEPENDENT
DOING_HOUSEWORK: INDEPENDENT
MANAGING_FINANCES: INDEPENDENT

## 2024-03-14 NOTE — PROGRESS NOTES
"Subjective   Reason for Visit: Marcia Steinberg is an 69 y.o. female here for a Medicare Wellness visit.               HPI  Here for annual wellness visit.  Continues to struggle with good control of her microscopic colitis.  Currently on Entyvio per GI.  Diet-limited as multiple foods flare her colitis.  Exercise-very active, takes care of grandchildren  Eye exam-UTD, small cataract R eye  Dental-UTD    Patient Care Team:  Kaila Boo MD as PCP - General     Review of Systems    Objective   Vitals:  /62 (BP Location: Left arm, Patient Position: Sitting, BP Cuff Size: Adult)   Pulse 82   Temp 36.1 °C (97 °F)   Ht 1.626 m (5' 4\")   Wt (!) 43.1 kg (95 lb)   BMI 16.31 kg/m²       Physical Exam  Vitals reviewed.   Constitutional:       General: She is not in acute distress.     Appearance: Normal appearance.   HENT:      Head: Normocephalic and atraumatic.      Right Ear: Tympanic membrane and ear canal normal.      Left Ear: Tympanic membrane and ear canal normal.      Nose: Nose normal. No congestion or rhinorrhea.      Mouth/Throat:      Mouth: Mucous membranes are moist.      Pharynx: Oropharynx is clear.   Eyes:      Extraocular Movements: Extraocular movements intact.      Conjunctiva/sclera: Conjunctivae normal.      Pupils: Pupils are equal, round, and reactive to light.   Cardiovascular:      Rate and Rhythm: Normal rate and regular rhythm.      Pulses: Normal pulses.      Heart sounds: Normal heart sounds. No murmur heard.  Pulmonary:      Effort: Pulmonary effort is normal. No respiratory distress.      Breath sounds: Normal breath sounds.   Chest:   Breasts:     Right: Normal. No mass, nipple discharge or skin change.      Left: No mass, nipple discharge or skin change.   Abdominal:      General: Abdomen is flat. Bowel sounds are normal.      Palpations: Abdomen is soft.      Tenderness: There is no abdominal tenderness.   Musculoskeletal:         General: Normal range of motion.      " Cervical back: Normal range of motion and neck supple.   Lymphadenopathy:      Cervical: No cervical adenopathy.   Skin:     General: Skin is warm and dry.   Neurological:      General: No focal deficit present.      Mental Status: She is alert and oriented to person, place, and time.   Psychiatric:         Mood and Affect: Mood normal.         Thought Content: Thought content normal.       .ascvd  Assessment/Plan   Problem List Items Addressed This Visit    None  Visit Diagnoses       Routine general medical examination at health care facility    -  Primary    BMI less than 19,adult      Encourage continued efforts to get adequate caloric intake.  Declined referral to nutrition    Diabetes mellitus screening        Relevant Orders    Comprehensive Metabolic Panel    Screening for cardiovascular condition        Cardiac Risk Assessment  Cardiovascular risk was discussed .   ASCVD 10 year risk score: Not able to be determined due to no recent lipid panel.  Relevant Orders    Lipid panel    Asymptomatic menopausal state        Relevant Orders    XR DEXA bone density    Encounter for screening mammogram for breast cancer        Relevant Orders    BI mammo bilateral screening tomosynthesis    Unexplained endometrial cells on cervical Pap smear        Never followed up with GYN following last year's Pap test results.  She denies any abnormal bleeding.  Recommend ultrasound to assess for any evidence of endometrial thickening.  Relevant Orders    US PELVIS TRANSABDOMINAL WITH TRANSVAGINAL    Screening for multiple conditions      Depression screening completed using the PHQ-2 questions with results documented in the chart/encounter.  No concerns for depression risk  (See Rooming Screenings for documentation)  Results were reviewed and discussed with Marcia Steinberg. epr      Encounter for cardiac risk counseling

## 2024-03-19 ENCOUNTER — OFFICE VISIT (OUTPATIENT)
Dept: BEHAVIORAL HEALTH | Facility: CLINIC | Age: 69
End: 2024-03-19
Payer: MEDICARE

## 2024-03-19 VITALS
DIASTOLIC BLOOD PRESSURE: 56 MMHG | WEIGHT: 92 LBS | SYSTOLIC BLOOD PRESSURE: 112 MMHG | BODY MASS INDEX: 15.71 KG/M2 | HEIGHT: 64 IN | HEART RATE: 78 BPM

## 2024-03-19 DIAGNOSIS — F41.1 GAD (GENERALIZED ANXIETY DISORDER): ICD-10-CM

## 2024-03-19 PROCEDURE — 1159F MED LIST DOCD IN RCRD: CPT | Performed by: CLINICAL NURSE SPECIALIST

## 2024-03-19 PROCEDURE — 1160F RVW MEDS BY RX/DR IN RCRD: CPT | Performed by: CLINICAL NURSE SPECIALIST

## 2024-03-19 PROCEDURE — 1036F TOBACCO NON-USER: CPT | Performed by: CLINICAL NURSE SPECIALIST

## 2024-03-19 PROCEDURE — 1123F ACP DISCUSS/DSCN MKR DOCD: CPT | Performed by: CLINICAL NURSE SPECIALIST

## 2024-03-19 PROCEDURE — 99214 OFFICE O/P EST MOD 30 MIN: CPT | Performed by: CLINICAL NURSE SPECIALIST

## 2024-03-19 PROCEDURE — 3008F BODY MASS INDEX DOCD: CPT | Performed by: CLINICAL NURSE SPECIALIST

## 2024-03-19 PROCEDURE — 1157F ADVNC CARE PLAN IN RCRD: CPT | Performed by: CLINICAL NURSE SPECIALIST

## 2024-03-19 RX ORDER — ESCITALOPRAM OXALATE 10 MG/1
10 TABLET ORAL DAILY
Qty: 90 TABLET | Refills: 1 | Status: SHIPPED | OUTPATIENT
Start: 2024-03-19 | End: 2024-06-17

## 2024-03-19 RX ORDER — DIAZEPAM 5 MG/1
TABLET ORAL
Qty: 30 TABLET | Refills: 2 | Status: SHIPPED | OUTPATIENT
Start: 2024-03-19 | End: 2024-06-11 | Stop reason: SDUPTHER

## 2024-03-19 NOTE — PROGRESS NOTES
Outpatient Psychiatry      Subjective     Stephany Bedolla, a 69 y.o. female,   presents  for established patient appointment for outpatient psychiatry /medication management for an in person appointment      Diagnoses :  Generalized anxiety disorder     Patient Active Problem List   Diagnosis    Antinuclear antibody (MAUREEN) titer greater than 1:80    Anxiety    Astigmatism, bilateral    Chronic cough    Chronic sciatica    Hyperlipidemia, familial, high LDL    Hyperopia    Hypokalemia    Chronic diarrhea    Irritable bowel syndrome with diarrhea    Nuclear sclerosis    Olecranon bursitis of left elbow    Presbyopia of both eyes    Primary insomnia    PVD (posterior vitreous detachment), right eye    Situational phobia    Vitamin D insufficiency    Weight loss, abnormal    Microscopic colitis     Treatment Plan/Recommendations: 10-12 weeks follow up , can continue self care and wellness efforts and maintain routine health screenings , can call  for treatment and scheduling concerns   Follow-up plan was discussed with patient.    Review with patient: Treatment plan reviewed with the patient.  Medication risks/benefit reviewed with the patient    HPI:  patient reports anxiety is improved , no longer having panic attacks   she has seen her pcp for assessment of weight loss and she has had loose stools   she reports with taking diazepam she is able to feel rested and gets some sleep   denies any substance abuse concerns , she does not use any illicit drugs , no alcohol, no cbd or thc products used   no psychoses   thoughts are without thought blocking , are not tangential and are organized   there are no ocd like behaviors   she does not have any paranoid or delusional thoughts , she is not having any recent hallucinations   no history of koko or hypomania   she stays active , her roger is important to her     I have personally reviewed the OARRS report for STEPHANY BEDOLLA. I have considered the risks of  abuse, dependence, addiction and diversion.   I have the following concerns: no concerns on oarrs.   Is the patient prescribed a combination of a benzodiazepine and opioid? No.   Last urine drug screening date: 6/15/23 results as expected , no concerns   Results of last screen: Results as expected.   Date of the last Controlled Substance Agreement: 6/13/23   BENZODIAZEPINES   What is the patient's goal of therapy? to treat tay and manage intense anxiety.  Is this being achieved with current treatment? yes , she reports improved anxiety.    it is my opinion that this patient is benefitting from benzodiazepine therapy.   Physical functioning: Better   Family relationships: Better   Social relationships: Better   Mood: Better   Sleep patterns: Better   Overall functioning: Better   Current or Past Use of Non-Controlled Medication: Serotonin Reuptake Inhibitor (SSRI).      Current Outpatient Medications:     amitriptyline (Elavil) 10 mg tablet, Take 1 tablet (10 mg) by mouth once daily at bedtime., Disp: , Rfl:     diazePAM (Valium) 5 mg tablet, TAKE 1 TABLET BY MOUTH EACH EVENING, Disp: 30 tablet, Rfl: 1    escitalopram (Lexapro) 10 mg tablet, Take 1 tablet (10 mg) by mouth once daily., Disp: 90 tablet, Rfl: 1    vedolizumab (Entyvio) 300 mg injection, 300 mg week 0, 2, 6, then every 8 weeks, Disp: , Rfl:   Medical History:  Past Medical History:   Diagnosis Date    Acute recurrent pansinusitis 01/31/2017    Acute recurrent pansinusitis    Adhesive capsulitis of right shoulder 07/06/2018    Adhesive capsulitis of right shoulder    Low back pain, unspecified 10/16/2014    Chronic lower back pain    Low back pain, unspecified 07/11/2019    Chronic low back pain    Low serum cortisol level 10/30/2023    Olecranon bursitis, left elbow 12/05/2019    Olecranon bursitis of left elbow    Pain in right shoulder 07/11/2019    Chronic right shoulder pain    Pain in right shoulder 03/26/2018    Shoulder pain, right    Personal  history of other diseases of the respiratory system 12/16/2015    History of acute pharyngitis    Personal history of other drug therapy 12/05/2019    History of influenza vaccination    Primary osteoarthritis, right shoulder 09/13/2017    Arthritis of right acromioclavicular joint    Sciatica, left side 11/14/2017    Chronic sciatica of left side    Segmental and somatic dysfunction of upper extremity 07/11/2019    Segmental and somatic dysfunction of upper extremity     Surgical History:  Past Surgical History:   Procedure Laterality Date    CHOLECYSTECTOMY  10/16/2014    Cholecystectomy    CT ABDOMEN PELVIS ANGIOGRAM W AND/OR WO IV CONTRAST  3/1/2023    CT ABDOMEN PELVIS ANGIOGRAM W AND/OR WO IV CONTRAST [facility]    OTHER SURGICAL HISTORY  12/08/2022    Tonsillectomy    OTHER SURGICAL HISTORY  12/21/2022    Colonoscopy     Family History:  Family History   Problem Relation Name Age of Onset    Cataracts Mother      Anxiety disorder Mother      Cataracts Father      Other (Asbestosis) Father      Breast cancer Sister  60    Anxiety disorder Maternal Grandmother       Social History:  Social History     Socioeconomic History    Marital status:      Spouse name: Not on file    Number of children: Not on file    Years of education: Not on file    Highest education level: Not on file   Occupational History    Not on file   Tobacco Use    Smoking status: Never    Smokeless tobacco: Never   Vaping Use    Vaping Use: Never used   Substance and Sexual Activity    Alcohol use: Never    Drug use: Never    Sexual activity: Not on file   Other Topics Concern    Not on file   Social History Narrative    Not on file     Social Determinants of Health     Financial Resource Strain: Not on file   Food Insecurity: Not on file   Transportation Needs: Not on file   Physical Activity: Not on file   Stress: Not on file   Social Connections: Not on file   Intimate Partner Violence: Not on file   Housing Stability: Not on file      Record Review: brief     Vitals:    03/19/24 1306   BP: 112/56   Pulse: 78     Bonita Angulo, APRN-CNS

## 2024-03-21 ENCOUNTER — TELEPHONE (OUTPATIENT)
Dept: PRIMARY CARE | Facility: CLINIC | Age: 69
End: 2024-03-21

## 2024-03-21 ENCOUNTER — HOSPITAL ENCOUNTER (OUTPATIENT)
Dept: RADIOLOGY | Facility: HOSPITAL | Age: 69
Discharge: HOME | End: 2024-03-21
Payer: MEDICARE

## 2024-03-21 ENCOUNTER — LAB (OUTPATIENT)
Dept: LAB | Facility: LAB | Age: 69
End: 2024-03-21
Payer: MEDICARE

## 2024-03-21 DIAGNOSIS — Z13.6 SCREENING FOR CARDIOVASCULAR CONDITION: ICD-10-CM

## 2024-03-21 DIAGNOSIS — Z13.1 DIABETES MELLITUS SCREENING: ICD-10-CM

## 2024-03-21 DIAGNOSIS — R87.618 UNEXPLAINED ENDOMETRIAL CELLS ON CERVICAL PAP SMEAR: ICD-10-CM

## 2024-03-21 LAB
ALBUMIN SERPL BCP-MCNC: 4.3 G/DL (ref 3.4–5)
ALP SERPL-CCNC: 81 U/L (ref 33–136)
ALT SERPL W P-5'-P-CCNC: 10 U/L (ref 7–45)
ANION GAP SERPL CALC-SCNC: 8 MMOL/L (ref 10–20)
AST SERPL W P-5'-P-CCNC: 21 U/L (ref 9–39)
BILIRUB SERPL-MCNC: 0.4 MG/DL (ref 0–1.2)
BUN SERPL-MCNC: 18 MG/DL (ref 6–23)
CALCIUM SERPL-MCNC: 9.4 MG/DL (ref 8.6–10.3)
CHLORIDE SERPL-SCNC: 101 MMOL/L (ref 98–107)
CHOLEST SERPL-MCNC: 217 MG/DL (ref 0–199)
CHOLESTEROL/HDL RATIO: 2.7
CO2 SERPL-SCNC: 33 MMOL/L (ref 21–32)
CREAT SERPL-MCNC: 0.57 MG/DL (ref 0.5–1.05)
EGFRCR SERPLBLD CKD-EPI 2021: >90 ML/MIN/1.73M*2
GLUCOSE SERPL-MCNC: 80 MG/DL (ref 74–99)
HDLC SERPL-MCNC: 81.1 MG/DL
LDLC SERPL CALC-MCNC: 119 MG/DL
NON HDL CHOLESTEROL: 136 MG/DL (ref 0–149)
POTASSIUM SERPL-SCNC: 3.7 MMOL/L (ref 3.5–5.3)
PROT SERPL-MCNC: 7.4 G/DL (ref 6.4–8.2)
SODIUM SERPL-SCNC: 138 MMOL/L (ref 136–145)
TRIGL SERPL-MCNC: 87 MG/DL (ref 0–149)
VLDL: 17 MG/DL (ref 0–40)

## 2024-03-21 PROCEDURE — 76856 US EXAM PELVIC COMPLETE: CPT

## 2024-03-21 PROCEDURE — 80053 COMPREHEN METABOLIC PANEL: CPT

## 2024-03-21 PROCEDURE — 76856 US EXAM PELVIC COMPLETE: CPT | Performed by: STUDENT IN AN ORGANIZED HEALTH CARE EDUCATION/TRAINING PROGRAM

## 2024-03-21 PROCEDURE — 80061 LIPID PANEL: CPT

## 2024-03-21 PROCEDURE — 36415 COLL VENOUS BLD VENIPUNCTURE: CPT

## 2024-03-21 PROCEDURE — 76830 TRANSVAGINAL US NON-OB: CPT | Performed by: STUDENT IN AN ORGANIZED HEALTH CARE EDUCATION/TRAINING PROGRAM

## 2024-03-21 NOTE — TELEPHONE ENCOUNTER
----- Message from Julieta Hartley MD sent at 3/21/2024  3:43 PM EDT -----  Please advise patient that cholesterol is mildly elevated.  Would recommend she make an appoint with Dr. Boo to discuss.

## 2024-03-25 NOTE — TELEPHONE ENCOUNTER
----- Message from Kaila Boo MD sent at 3/25/2024 12:20 PM EDT -----  Pelvic ultrasound did not reveal any abnormal thickening of the uterine lining.  No further follow-up recommended at present unless she is experiences any vaginal bleeding, spotting or pelvic pain.

## 2024-03-26 NOTE — TELEPHONE ENCOUNTER
T/c to pt, spoke with pt, advised pt that the pelvic ultrasound did not reveal any abnormal thickening of the uterine lining.  No further follow-up recommended at present unless she is experiences any vaginal bleeding, spotting or pelvic pain, pt states no vaginal sxs at this time.

## 2024-06-11 ENCOUNTER — TELEMEDICINE (OUTPATIENT)
Dept: BEHAVIORAL HEALTH | Facility: CLINIC | Age: 69
End: 2024-06-11
Payer: MEDICARE

## 2024-06-11 DIAGNOSIS — F41.1 GAD (GENERALIZED ANXIETY DISORDER): ICD-10-CM

## 2024-06-11 PROCEDURE — 99212 OFFICE O/P EST SF 10 MIN: CPT | Performed by: CLINICAL NURSE SPECIALIST

## 2024-06-11 PROCEDURE — 1123F ACP DISCUSS/DSCN MKR DOCD: CPT | Performed by: CLINICAL NURSE SPECIALIST

## 2024-06-11 PROCEDURE — 1160F RVW MEDS BY RX/DR IN RCRD: CPT | Performed by: CLINICAL NURSE SPECIALIST

## 2024-06-11 PROCEDURE — 1159F MED LIST DOCD IN RCRD: CPT | Performed by: CLINICAL NURSE SPECIALIST

## 2024-06-11 PROCEDURE — 3008F BODY MASS INDEX DOCD: CPT | Performed by: CLINICAL NURSE SPECIALIST

## 2024-06-11 PROCEDURE — 1157F ADVNC CARE PLAN IN RCRD: CPT | Performed by: CLINICAL NURSE SPECIALIST

## 2024-06-11 RX ORDER — DIAZEPAM 5 MG/1
TABLET ORAL
Qty: 30 TABLET | Refills: 2 | Status: SHIPPED | OUTPATIENT
Start: 2024-06-11

## 2024-06-11 NOTE — PROGRESS NOTES
Outpatient Psychiatry      Subjective   Stephany Bedolla, a 69 y.o. female, presents  for established patient appointment for outpatient psychiatry /medication management for an in person appointment      Diagnoses :  Generalized anxiety disorder      Treatment Plan/Recommendations: 10-12 weeks follow up , can continue self care and wellness efforts and maintain routine health screenings , can call  for treatment and scheduling concerns   Follow-up plan was discussed with patient.    Review with patient: Treatment plan reviewed with the patient.  Medication risks/benefit reviewed with the patient    HPI:patient reports anxiety is improved , no longer having panic attacks   she has seen her pcp for assessment of weight loss and she has had loose stools   she reports with taking diazepam she is able to feel rested and gets some sleep   denies any substance abuse concerns , she does not use any illicit drugs , no alcohol, no cbd or thc products used   no psychoses   thoughts are organized   there are no ocd like behaviors   she does not have any paranoid or delusional thoughts , she is not having any recent hallucinations   no history of koko or hypomania   she stays active , her roger is important to her     I have personally reviewed the OARRS report for STEPHANY BEDOLLA. I have considered the risks of abuse, dependence, addiction and diversion.   I have the following concerns: no concerns on oarrs.   Is the patient prescribed a combination of a benzodiazepine and opioid? No.   Last urine drug screening date: 6/15/23 results as expected , no concerns   Results of last screen: Results as expected.   Date of the last Controlled Substance Agreement: 6/13/23 , reviewed csa verbally   BENZODIAZEPINES   What is the patient's goal of therapy? to treat tay and manage intense anxiety.  Is this being achieved with current treatment? yes , she reports improved anxiety.    it is my opinion that this patient is  benefitting from benzodiazepine therapy.   Physical functioning: Better   Family relationships: Better   Social relationships: Better   Mood: Better   Sleep patterns: Better   Overall functioning: Better   Current or Past Use of Non-Controlled Medication: Serotonin Reuptake Inhibitor (SSRI).                Current Outpatient Medications:     amitriptyline (Elavil) 10 mg tablet, Take 1 tablet (10 mg) by mouth once daily at bedtime., Disp: , Rfl:     diazePAM (Valium) 5 mg tablet, TAKE 1 TABLET BY MOUTH EACH EVENING, Disp: 30 tablet, Rfl: 2    escitalopram (Lexapro) 10 mg tablet, Take 1 tablet (10 mg) by mouth once daily., Disp: 90 tablet, Rfl: 1    vedolizumab (Entyvio) 300 mg injection, 300 mg week 0, 2, 6, then every 8 weeks, Disp: , Rfl:     Record Review: brief        Vitals:  There were no vitals filed for this visit.    Bonita Angulo, APRN-CNS

## 2024-08-29 ENCOUNTER — LAB (OUTPATIENT)
Dept: LAB | Facility: LAB | Age: 69
End: 2024-08-29
Payer: MEDICARE

## 2024-08-29 DIAGNOSIS — K52.9 NONINFECTIVE GASTROENTERITIS AND COLITIS, UNSPECIFIED: Primary | ICD-10-CM

## 2024-08-29 PROCEDURE — 80280 DRUG ASSAY VEDOLIZUMAB: CPT

## 2024-08-29 PROCEDURE — 36415 COLL VENOUS BLD VENIPUNCTURE: CPT

## 2024-09-03 ENCOUNTER — APPOINTMENT (OUTPATIENT)
Dept: BEHAVIORAL HEALTH | Facility: CLINIC | Age: 69
End: 2024-09-03
Payer: MEDICARE

## 2024-09-04 LAB
SCAN RESULT: NORMAL
VEDOLIZUMAB AB SERPL IA-MCNC: <1.6 U/ML
VEDOLIZUMAB SERPL IA-MCNC: 22.8 UG/ML

## 2024-09-05 ENCOUNTER — TELEMEDICINE (OUTPATIENT)
Dept: BEHAVIORAL HEALTH | Facility: CLINIC | Age: 69
End: 2024-09-05
Payer: MEDICARE

## 2024-09-05 DIAGNOSIS — Z79.899 LONG-TERM CURRENT USE OF BENZODIAZEPINE: ICD-10-CM

## 2024-09-05 DIAGNOSIS — F41.1 GAD (GENERALIZED ANXIETY DISORDER): ICD-10-CM

## 2024-09-05 PROCEDURE — 99212 OFFICE O/P EST SF 10 MIN: CPT | Performed by: CLINICAL NURSE SPECIALIST

## 2024-09-05 PROCEDURE — 1157F ADVNC CARE PLAN IN RCRD: CPT | Performed by: CLINICAL NURSE SPECIALIST

## 2024-09-05 PROCEDURE — 1159F MED LIST DOCD IN RCRD: CPT | Performed by: CLINICAL NURSE SPECIALIST

## 2024-09-05 PROCEDURE — 1123F ACP DISCUSS/DSCN MKR DOCD: CPT | Performed by: CLINICAL NURSE SPECIALIST

## 2024-09-05 PROCEDURE — 1160F RVW MEDS BY RX/DR IN RCRD: CPT | Performed by: CLINICAL NURSE SPECIALIST

## 2024-09-05 RX ORDER — DICYCLOMINE HYDROCHLORIDE 10 MG/1
10 CAPSULE ORAL
COMMUNITY
Start: 2024-09-03

## 2024-09-05 RX ORDER — DIAZEPAM 5 MG/1
TABLET ORAL
Qty: 30 TABLET | Refills: 2 | Status: SHIPPED | OUTPATIENT
Start: 2024-09-05

## 2024-09-05 RX ORDER — ESCITALOPRAM OXALATE 10 MG/1
10 TABLET ORAL DAILY
Qty: 90 TABLET | Refills: 1 | Status: SHIPPED | OUTPATIENT
Start: 2024-09-05 | End: 2024-12-04

## 2024-09-05 NOTE — PROGRESS NOTES
Outpatient Psychiatry      Subjective   Stephany Bedolla, a 69 y.o. female presents  for established patient appointment for outpatient psychiatry /medication management for an audio visual virtual appointment in real time ,she was at home for this appointment and she verbally consented to the appointment      Diagnoses :  Generalized anxiety disorder F41.1 stable      Treatment Plan/Recommendations: 10-12 weeks follow up , can continue self care and wellness efforts and maintain routine health screenings , can call  for treatment and scheduling concerns   Follow-up plan was discussed with patient.     Review with patient: Treatment plan reviewed with the patient.  Medication risks/benefit reviewed with the patient     HPI:patient reports anxiety is improved , no longer having panic attacks   she has seen her pcp for assessment of weight loss and she has had loose stools,   She is starting a new medication for this   she reports with taking diazepam she is able to feel rested and gets some sleep   denies any substance abuse concerns , she does not use any illicit drugs , no alcohol, no cbd or thc products used   no psychoses   thoughts are organized   there are no ocd like behaviors   she does not have any paranoid or delusional thoughts , she is not having any recent hallucinations   no history of koko or hypomania   she stays active , her roger is important to her  No concerns with falls   No concerns with cognitive functioning      I have personally reviewed the OARRS report for STEPHANY BEDOLLA. I have considered the risks of abuse, dependence, addiction and diversion.   I have the following concerns: no concerns on oarrs.   Is the patient prescribed a combination of a benzodiazepine and opioid? No.   Last urine drug screening date: 6/15/23 results as expected , no concerns   9/5/24 uds ordered   Date of the last Controlled Substance Agreement: 6/13/23 , reviewed csa verbally   BENZODIAZEPINES   What  is the patient's goal of therapy? to treat tay and manage intense anxiety.  Is this being achieved with current treatment? yes , she reports improved anxiety.    it is my opinion that this patient is benefitting from benzodiazepine therapy.   Physical functioning: Better   Family relationships: Better   Social relationships: Better   Mood: Better   Sleep patterns: Better   Overall functioning: Better   Current or Past Use of Non-Controlled Medication: Serotonin Reuptake Inhibitor (SSRI).    Ros psych and medical as noted above     Patient Active Problem List   Diagnosis    Antinuclear antibody (MAUREEN) titer greater than 1:80    Anxiety    Astigmatism, bilateral    Chronic cough    Chronic sciatica    Hyperlipidemia, familial, high LDL    Hyperopia    Hypokalemia    Chronic diarrhea    Irritable bowel syndrome with diarrhea    Nuclear sclerosis    Olecranon bursitis of left elbow    Presbyopia of both eyes    Primary insomnia    PVD (posterior vitreous detachment), right eye    Situational phobia    Vitamin D insufficiency    Weight loss, abnormal    Microscopic colitis     Current Outpatient Medications:     amitriptyline (Elavil) 10 mg tablet, Take 1 tablet (10 mg) by mouth once daily at bedtime., Disp: , Rfl:     diazePAM (Valium) 5 mg tablet, TAKE 1 TABLET BY MOUTH EACH EVENING, Disp: 30 tablet, Rfl: 2    escitalopram (Lexapro) 10 mg tablet, Take 1 tablet (10 mg) by mouth once daily., Disp: 90 tablet, Rfl: 1    vedolizumab (Entyvio) 300 mg injection, 300 mg week 0, 2, 6, then every 8 weeks, Disp: , Rfl:   Medical History:  Past Medical History:   Diagnosis Date    Acute recurrent pansinusitis 01/31/2017    Acute recurrent pansinusitis    Adhesive capsulitis of right shoulder 07/06/2018    Adhesive capsulitis of right shoulder    Low back pain, unspecified 10/16/2014    Chronic lower back pain    Low back pain, unspecified 07/11/2019    Chronic low back pain    Low serum cortisol level 10/30/2023    Ryan  bursitis, left elbow 12/05/2019    Olecranon bursitis of left elbow    Pain in right shoulder 07/11/2019    Chronic right shoulder pain    Pain in right shoulder 03/26/2018    Shoulder pain, right    Personal history of other diseases of the respiratory system 12/16/2015    History of acute pharyngitis    Personal history of other drug therapy 12/05/2019    History of influenza vaccination    Primary osteoarthritis, right shoulder 09/13/2017    Arthritis of right acromioclavicular joint    Sciatica, left side 11/14/2017    Chronic sciatica of left side    Segmental and somatic dysfunction of upper extremity 07/11/2019    Segmental and somatic dysfunction of upper extremity     Surgical History:  Past Surgical History:   Procedure Laterality Date    CHOLECYSTECTOMY  10/16/2014    Cholecystectomy    CT ABDOMEN PELVIS ANGIOGRAM W AND/OR WO IV CONTRAST  3/1/2023    CT ABDOMEN PELVIS ANGIOGRAM W AND/OR WO IV CONTRAST [facility]    OTHER SURGICAL HISTORY  12/08/2022    Tonsillectomy    OTHER SURGICAL HISTORY  12/21/2022    Colonoscopy     Family History:  Family History   Problem Relation Name Age of Onset    Cataracts Mother      Anxiety disorder Mother      Cataracts Father      Other (Asbestosis) Father      Breast cancer Sister  60    Anxiety disorder Maternal Grandmother       Social History:  Social History     Socioeconomic History    Marital status:      Spouse name: Not on file    Number of children: Not on file    Years of education: Not on file    Highest education level: Not on file   Occupational History    Not on file   Tobacco Use    Smoking status: Never    Smokeless tobacco: Never   Vaping Use    Vaping status: Never Used   Substance and Sexual Activity    Alcohol use: Never    Drug use: Never    Sexual activity: Not on file   Other Topics Concern    Not on file   Social History Narrative    Not on file     Social Determinants of Health     Financial Resource Strain: Not on file   Food Insecurity: Not  on file   Transportation Needs: Not on file   Physical Activity: Not on file   Stress: Not on file   Social Connections: Not on file   Intimate Partner Violence: Not on file   Housing Stability: Not on file     Vitals:  There were no vitals filed for this visit.    Bonita Angulo, APRN-CNS

## 2024-09-10 ENCOUNTER — LAB (OUTPATIENT)
Dept: LAB | Facility: LAB | Age: 69
End: 2024-09-10
Payer: MEDICARE

## 2024-09-10 DIAGNOSIS — Z79.899 LONG-TERM CURRENT USE OF BENZODIAZEPINE: ICD-10-CM

## 2024-09-10 DIAGNOSIS — K52.832 LYMPHOCYTIC COLITIS: Primary | ICD-10-CM

## 2024-09-10 LAB
ALBUMIN SERPL BCP-MCNC: 4 G/DL (ref 3.4–5)
ALP SERPL-CCNC: 65 U/L (ref 33–136)
ALT SERPL W P-5'-P-CCNC: 9 U/L (ref 7–45)
AMPHETAMINES UR QL SCN: ABNORMAL
ANION GAP SERPL CALC-SCNC: 9 MMOL/L (ref 10–20)
AST SERPL W P-5'-P-CCNC: 19 U/L (ref 9–39)
BARBITURATES UR QL SCN: ABNORMAL
BASOPHILS # BLD AUTO: 0.03 X10*3/UL (ref 0–0.1)
BASOPHILS NFR BLD AUTO: 0.6 %
BENZODIAZ UR QL SCN: ABNORMAL
BILIRUB SERPL-MCNC: 0.5 MG/DL (ref 0–1.2)
BUN SERPL-MCNC: 17 MG/DL (ref 6–23)
BZE UR QL SCN: ABNORMAL
CALCIUM SERPL-MCNC: 8.8 MG/DL (ref 8.6–10.3)
CANNABINOIDS UR QL SCN: ABNORMAL
CHLORIDE SERPL-SCNC: 103 MMOL/L (ref 98–107)
CO2 SERPL-SCNC: 33 MMOL/L (ref 21–32)
CREAT SERPL-MCNC: 0.58 MG/DL (ref 0.5–1.05)
CRP SERPL-MCNC: 0.12 MG/DL
EGFRCR SERPLBLD CKD-EPI 2021: >90 ML/MIN/1.73M*2
EOSINOPHIL # BLD AUTO: 0.04 X10*3/UL (ref 0–0.7)
EOSINOPHIL NFR BLD AUTO: 0.9 %
ERYTHROCYTE [DISTWIDTH] IN BLOOD BY AUTOMATED COUNT: 12.7 % (ref 11.5–14.5)
ERYTHROCYTE [SEDIMENTATION RATE] IN BLOOD BY WESTERGREN METHOD: 21 MM/H (ref 0–30)
FENTANYL+NORFENTANYL UR QL SCN: ABNORMAL
GLUCOSE SERPL-MCNC: 83 MG/DL (ref 74–99)
HCT VFR BLD AUTO: 33.8 % (ref 36–46)
HGB BLD-MCNC: 10.5 G/DL (ref 12–16)
IMM GRANULOCYTES # BLD AUTO: 0.01 X10*3/UL (ref 0–0.7)
IMM GRANULOCYTES NFR BLD AUTO: 0.2 % (ref 0–0.9)
LYMPHOCYTES # BLD AUTO: 1.75 X10*3/UL (ref 1.2–4.8)
LYMPHOCYTES NFR BLD AUTO: 37.5 %
MCH RBC QN AUTO: 31.1 PG (ref 26–34)
MCHC RBC AUTO-ENTMCNC: 31.1 G/DL (ref 32–36)
MCV RBC AUTO: 100 FL (ref 80–100)
METHADONE UR QL SCN: ABNORMAL
MONOCYTES # BLD AUTO: 0.49 X10*3/UL (ref 0.1–1)
MONOCYTES NFR BLD AUTO: 10.5 %
NEUTROPHILS # BLD AUTO: 2.35 X10*3/UL (ref 1.2–7.7)
NEUTROPHILS NFR BLD AUTO: 50.3 %
NRBC BLD-RTO: 0 /100 WBCS (ref 0–0)
OPIATES UR QL SCN: ABNORMAL
OXYCODONE+OXYMORPHONE UR QL SCN: ABNORMAL
PCP UR QL SCN: ABNORMAL
PLATELET # BLD AUTO: 158 X10*3/UL (ref 150–450)
POTASSIUM SERPL-SCNC: 3.7 MMOL/L (ref 3.5–5.3)
PROT SERPL-MCNC: 6.9 G/DL (ref 6.4–8.2)
RBC # BLD AUTO: 3.38 X10*6/UL (ref 4–5.2)
SODIUM SERPL-SCNC: 141 MMOL/L (ref 136–145)
WBC # BLD AUTO: 4.7 X10*3/UL (ref 4.4–11.3)

## 2024-09-10 PROCEDURE — 80053 COMPREHEN METABOLIC PANEL: CPT

## 2024-09-10 PROCEDURE — 85652 RBC SED RATE AUTOMATED: CPT

## 2024-09-10 PROCEDURE — 80307 DRUG TEST PRSMV CHEM ANLYZR: CPT

## 2024-09-10 PROCEDURE — 80346 BENZODIAZEPINES1-12: CPT

## 2024-09-10 PROCEDURE — 86140 C-REACTIVE PROTEIN: CPT

## 2024-09-10 PROCEDURE — 85025 COMPLETE CBC W/AUTO DIFF WBC: CPT

## 2024-09-13 LAB
1OH-MIDAZOLAM UR CFM-MCNC: <25 NG/ML
7AMINOCLONAZEPAM UR CFM-MCNC: <25 NG/ML
A-OH ALPRAZ UR CFM-MCNC: <25 NG/ML
ALPRAZ UR CFM-MCNC: <25 NG/ML
CHLORDIAZEP UR CFM-MCNC: <25 NG/ML
CLONAZEPAM UR CFM-MCNC: <25 NG/ML
DIAZEPAM UR CFM-MCNC: <25 NG/ML
LORAZEPAM UR CFM-MCNC: <25 NG/ML
MIDAZOLAM UR CFM-MCNC: <25 NG/ML
NORDIAZEPAM UR CFM-MCNC: 539 NG/ML
OXAZEPAM UR CFM-MCNC: >1000 NG/ML
TEMAZEPAM UR CFM-MCNC: >1000 NG/ML

## 2024-12-03 ENCOUNTER — APPOINTMENT (OUTPATIENT)
Dept: BEHAVIORAL HEALTH | Facility: CLINIC | Age: 69
End: 2024-12-03
Payer: COMMERCIAL

## 2024-12-03 DIAGNOSIS — F41.1 GAD (GENERALIZED ANXIETY DISORDER): ICD-10-CM

## 2024-12-03 PROCEDURE — 1123F ACP DISCUSS/DSCN MKR DOCD: CPT | Performed by: CLINICAL NURSE SPECIALIST

## 2024-12-03 PROCEDURE — 99212 OFFICE O/P EST SF 10 MIN: CPT | Performed by: CLINICAL NURSE SPECIALIST

## 2024-12-03 PROCEDURE — 1157F ADVNC CARE PLAN IN RCRD: CPT | Performed by: CLINICAL NURSE SPECIALIST

## 2024-12-03 RX ORDER — ESCITALOPRAM OXALATE 10 MG/1
10 TABLET ORAL DAILY
Qty: 90 TABLET | Refills: 1 | Status: SHIPPED | OUTPATIENT
Start: 2024-12-03 | End: 2025-03-03

## 2024-12-03 RX ORDER — DIAZEPAM 5 MG/1
TABLET ORAL
Qty: 30 TABLET | Refills: 2 | Status: SHIPPED | OUTPATIENT
Start: 2024-12-03

## 2024-12-03 NOTE — PROGRESS NOTES
Outpatient Psychiatry      Subjective   Stephany Bedolla, a 69 y.o. female,presents  for established patient appointment for outpatient psychiatry /medication management for an audio and video virtual appointment in real time ,she was at home for this appointment   Virtual or Telephone Consent    An interactive audio and video telecommunication system which permits real time communications between the patient (at the originating site) and provider (at the distant site) was utilized to provide this telehealth service.   Verbal consent was requested and obtained from Stephany Bedolla on this date, 12/3/24 for a telehealth visit.        Diagnoses :  Generalized anxiety disorder F41.1 stable      Treatment Plan/Recommendations: 10-12 weeks follow up , can continue self care and wellness efforts and maintain routine health screenings , can call  for treatment and scheduling concerns   Follow-up plan was discussed with patient.     Review with patient: Treatment plan reviewed with the patient.  Medication risks/benefit reviewed with the patient     HPI:patient reports anxiety is improved , no longer having panic attacks   No new health issues    she reports with taking diazepam she is able to feel rested and gets some sleep   denies any substance abuse concerns , she does not use any illicit drugs , no alcohol, no cbd or thc products used   no psychoses   thoughts are organized   there are no ocd like behaviors   she does not have any paranoid or delusional thoughts , she is not having any recent hallucinations   no history of koko or hypomania   she stays active , her roger is important to her  No concerns with falls   No concerns with cognitive functioning      I have personally reviewed the OARRS report for STEPHANY BEDOLLA. I have considered the risks of abuse, dependence, addiction and diversion.   I have the following concerns: no concerns on oarrs.   Is the patient prescribed a combination of a  benzodiazepine and opioid? No.   Last urine drug screening results as expected , no concerns 9/5/24    Date of the last Controlled Substance Agreement: 6/13/23 , reviewed csa verbally   BENZODIAZEPINES   What is the patient's goal of therapy? to treat tay and manage intense anxiety.  Is this being achieved with current treatment? yes , she reports improved anxiety.    it is my opinion that this patient is benefitting from benzodiazepine therapy.   Physical functioning: Better   Family relationships: Better   Social relationships: Better   Mood: Better   Sleep patterns: Better   Overall functioning: Better   Current or Past Use of Non-Controlled Medication: Serotonin Reuptake Inhibitor (SSRI).     Ros psych and medical as noted above     Patient Active Problem List   Diagnosis    Antinuclear antibody (MAUREEN) titer greater than 1:80    Anxiety    Astigmatism, bilateral    Chronic cough    Chronic sciatica    Hyperlipidemia, familial, high LDL    Hyperopia    Hypokalemia    Chronic diarrhea    Irritable bowel syndrome with diarrhea    Nuclear sclerosis    Olecranon bursitis of left elbow    Presbyopia of both eyes    Primary insomnia    PVD (posterior vitreous detachment), right eye    Situational phobia    Vitamin D insufficiency    Weight loss, abnormal    Microscopic colitis     Current Outpatient Medications:     amitriptyline (Elavil) 10 mg tablet, Take 1 tablet (10 mg) by mouth once daily at bedtime., Disp: , Rfl:     diazePAM (Valium) 5 mg tablet, TAKE 1 TABLET BY MOUTH EACH EVENING, Disp: 30 tablet, Rfl: 2    dicyclomine (Bentyl) 10 mg capsule, Take 1 capsule (10 mg) by mouth., Disp: , Rfl:     escitalopram (Lexapro) 10 mg tablet, Take 1 tablet (10 mg) by mouth once daily., Disp: 90 tablet, Rfl: 1  Medical History:  Past Medical History:   Diagnosis Date    Acute recurrent pansinusitis 01/31/2017    Acute recurrent pansinusitis    Adhesive capsulitis of right shoulder 07/06/2018    Adhesive capsulitis of right  shoulder    Low back pain, unspecified 10/16/2014    Chronic lower back pain    Low back pain, unspecified 07/11/2019    Chronic low back pain    Low serum cortisol level 10/30/2023    Olecranon bursitis, left elbow 12/05/2019    Olecranon bursitis of left elbow    Pain in right shoulder 07/11/2019    Chronic right shoulder pain    Pain in right shoulder 03/26/2018    Shoulder pain, right    Personal history of other diseases of the respiratory system 12/16/2015    History of acute pharyngitis    Personal history of other drug therapy 12/05/2019    History of influenza vaccination    Primary osteoarthritis, right shoulder 09/13/2017    Arthritis of right acromioclavicular joint    Sciatica, left side 11/14/2017    Chronic sciatica of left side    Segmental and somatic dysfunction of upper extremity 07/11/2019    Segmental and somatic dysfunction of upper extremity     Surgical History:  Past Surgical History:   Procedure Laterality Date    CHOLECYSTECTOMY  10/16/2014    Cholecystectomy    CT ABDOMEN PELVIS ANGIOGRAM W AND/OR WO IV CONTRAST  3/1/2023    CT ABDOMEN PELVIS ANGIOGRAM W AND/OR WO IV CONTRAST [facility] CT    OTHER SURGICAL HISTORY  12/08/2022    Tonsillectomy    OTHER SURGICAL HISTORY  12/21/2022    Colonoscopy     Family History:  Family History   Problem Relation Name Age of Onset    Cataracts Mother      Anxiety disorder Mother      Cataracts Father      Other (Asbestosis) Father      Breast cancer Sister  60    Anxiety disorder Maternal Grandmother       Social History:  Social History     Socioeconomic History    Marital status:      Spouse name: Not on file    Number of children: Not on file    Years of education: Not on file    Highest education level: Not on file   Occupational History    Not on file   Tobacco Use    Smoking status: Never    Smokeless tobacco: Never   Vaping Use    Vaping status: Never Used   Substance and Sexual Activity    Alcohol use: Never    Drug use: Never    Sexual  activity: Not on file   Other Topics Concern    Not on file   Social History Narrative    Not on file     Social Drivers of Health     Financial Resource Strain: Not on file   Food Insecurity: Not on file   Transportation Needs: Not on file   Physical Activity: Not on file   Stress: Not on file   Social Connections: Not on file   Intimate Partner Violence: Not on file   Housing Stability: Not on file     Vitals:  There were no vitals filed for this visit.    Bonita Angulo, APRN-CNS

## 2024-12-06 ENCOUNTER — APPOINTMENT (OUTPATIENT)
Dept: DERMATOLOGY | Facility: CLINIC | Age: 69
End: 2024-12-06
Payer: MEDICARE

## 2024-12-17 ENCOUNTER — APPOINTMENT (OUTPATIENT)
Dept: DERMATOLOGY | Facility: CLINIC | Age: 69
End: 2024-12-17
Payer: MEDICARE

## 2025-01-21 ENCOUNTER — APPOINTMENT (OUTPATIENT)
Dept: DERMATOLOGY | Facility: CLINIC | Age: 70
End: 2025-01-21
Payer: COMMERCIAL

## 2025-03-04 ENCOUNTER — APPOINTMENT (OUTPATIENT)
Dept: BEHAVIORAL HEALTH | Facility: CLINIC | Age: 70
End: 2025-03-04
Payer: MEDICARE

## 2025-03-04 DIAGNOSIS — F41.1 GAD (GENERALIZED ANXIETY DISORDER): ICD-10-CM

## 2025-03-04 PROCEDURE — 1123F ACP DISCUSS/DSCN MKR DOCD: CPT | Performed by: CLINICAL NURSE SPECIALIST

## 2025-03-04 PROCEDURE — 99212 OFFICE O/P EST SF 10 MIN: CPT | Performed by: CLINICAL NURSE SPECIALIST

## 2025-03-04 PROCEDURE — 1157F ADVNC CARE PLAN IN RCRD: CPT | Performed by: CLINICAL NURSE SPECIALIST

## 2025-03-04 RX ORDER — DIAZEPAM 5 MG/1
TABLET ORAL
Qty: 30 TABLET | Refills: 2 | Status: SHIPPED | OUTPATIENT
Start: 2025-03-04

## 2025-03-04 RX ORDER — ESCITALOPRAM OXALATE 10 MG/1
10 TABLET ORAL DAILY
Qty: 90 TABLET | Refills: 1 | Status: SHIPPED | OUTPATIENT
Start: 2025-03-04 | End: 2025-06-02

## 2025-03-04 NOTE — PROGRESS NOTES
Outpatient Psychiatry      Subjective   Stephany Bedolla, a 70 y.o. female, presents  for established patient appointment for outpatient psychiatry /medication management for an in person appointment in the office        Diagnoses :  Generalized anxiety disorder F41.1 stable      Treatment Plan/Recommendations: 10-12 weeks follow up , can continue self care and wellness efforts and maintain routine health screenings , can call  for treatment and scheduling concerns   Follow-up plan was discussed with patient.     Review with patient: Treatment plan reviewed with the patient.  Medication risks/benefit reviewed with the patient     HPI:patient reports anxiety is well managed , no longer having panic attacks   No new health issues    she reports with taking diazepam she is able to feel rested and gets some sleep   denies any substance abuse concerns , she does not use any illicit drugs , no alcohol, no cbd or thc products used   no psychoses   thoughts are organized   there are no ocd like behaviors   she does not have any paranoid or delusional thoughts , she is not having any recent hallucinations   no history of koko or hypomania   she stays active , her roger is important to her  No concerns with falls   No concerns with cognitive functioning      I have personally reviewed the OARRS report for STEPHANY BEDOLLA. I have considered the risks of abuse, dependence, addiction and diversion.   I have the following concerns: no concerns on oarrs.   Is the patient prescribed a combination of a benzodiazepine and opioid? No.   Last urine drug screening results as expected , no concerns 9/5/24    Date of the last Controlled Substance Agreement: 3/4/25  BENZODIAZEPINES   What is the patient's goal of therapy? to treat tay and manage intense anxiety.  Is this being achieved with current treatment? yes , she reports improved anxiety.    it is my opinion that this patient is benefitting from benzodiazepine therapy.    Physical functioning: Better   Family relationships: Better   Social relationships: Better   Mood: Better   Sleep patterns: Better   Overall functioning: Better   Current or Past Use of Non-Controlled Medication: Serotonin Reuptake Inhibitor (SSRI).     Ros psych and medical as noted above          Patient Active Problem List   Diagnosis    Antinuclear antibody (MAUREEN) titer greater than 1:80    Anxiety    Astigmatism, bilateral    Chronic cough    Chronic sciatica    Hyperlipidemia, familial, high LDL    Hyperopia    Hypokalemia    Chronic diarrhea    Irritable bowel syndrome with diarrhea    Nuclear sclerosis    Olecranon bursitis of left elbow    Presbyopia of both eyes    Primary insomnia    PVD (posterior vitreous detachment), right eye    Situational phobia    Vitamin D insufficiency    Weight loss, abnormal    Microscopic colitis     Current Outpatient Medications:     amitriptyline (Elavil) 10 mg tablet, Take 1 tablet (10 mg) by mouth once daily at bedtime., Disp: , Rfl:     diazePAM (Valium) 5 mg tablet, TAKE 1 TABLET BY MOUTH EACH EVENING, Disp: 30 tablet, Rfl: 2    dicyclomine (Bentyl) 10 mg capsule, Take 1 capsule (10 mg) by mouth., Disp: , Rfl:     escitalopram (Lexapro) 10 mg tablet, Take 1 tablet (10 mg) by mouth once daily., Disp: 90 tablet, Rfl: 1  Medical History:  Past Medical History:   Diagnosis Date    Acute recurrent pansinusitis 01/31/2017    Acute recurrent pansinusitis    Adhesive capsulitis of right shoulder 07/06/2018    Adhesive capsulitis of right shoulder    Low back pain, unspecified 10/16/2014    Chronic lower back pain    Low back pain, unspecified 07/11/2019    Chronic low back pain    Low serum cortisol level 10/30/2023    Olecranon bursitis, left elbow 12/05/2019    Olecranon bursitis of left elbow    Pain in right shoulder 07/11/2019    Chronic right shoulder pain    Pain in right shoulder 03/26/2018    Shoulder pain, right    Personal history of other diseases of the  respiratory system 12/16/2015    History of acute pharyngitis    Personal history of other drug therapy 12/05/2019    History of influenza vaccination    Primary osteoarthritis, right shoulder 09/13/2017    Arthritis of right acromioclavicular joint    Sciatica, left side 11/14/2017    Chronic sciatica of left side    Segmental and somatic dysfunction of upper extremity 07/11/2019    Segmental and somatic dysfunction of upper extremity     Surgical History:  Past Surgical History:   Procedure Laterality Date    CHOLECYSTECTOMY  10/16/2014    Cholecystectomy    CT ABDOMEN PELVIS ANGIOGRAM W AND/OR WO IV CONTRAST  3/1/2023    CT ABDOMEN PELVIS ANGIOGRAM W AND/OR WO IV CONTRAST [facility] CT    OTHER SURGICAL HISTORY  12/08/2022    Tonsillectomy    OTHER SURGICAL HISTORY  12/21/2022    Colonoscopy     Family History:  Family History   Problem Relation Name Age of Onset    Cataracts Mother      Anxiety disorder Mother      Cataracts Father      Other (Asbestosis) Father      Breast cancer Sister  60    Anxiety disorder Maternal Grandmother       Social History:  Social History     Socioeconomic History    Marital status:      Spouse name: Not on file    Number of children: Not on file    Years of education: Not on file    Highest education level: Not on file   Occupational History    Not on file   Tobacco Use    Smoking status: Never    Smokeless tobacco: Never   Vaping Use    Vaping status: Never Used   Substance and Sexual Activity    Alcohol use: Never    Drug use: Never    Sexual activity: Not on file   Other Topics Concern    Not on file   Social History Narrative    Not on file     Social Drivers of Health     Financial Resource Strain: Not on file   Food Insecurity: Not on file   Transportation Needs: Not on file   Physical Activity: Not on file   Stress: Not on file   Social Connections: Not on file   Intimate Partner Violence: Not on file   Housing Stability: Not on file     Vitals:  There were no vitals filed  for this visit.    Bonita Angluo, APRN-CNS

## 2025-03-20 ENCOUNTER — APPOINTMENT (OUTPATIENT)
Dept: PRIMARY CARE | Facility: CLINIC | Age: 70
End: 2025-03-20
Payer: MEDICARE

## 2025-03-20 VITALS
TEMPERATURE: 96.9 F | WEIGHT: 97 LBS | HEIGHT: 64 IN | DIASTOLIC BLOOD PRESSURE: 68 MMHG | BODY MASS INDEX: 16.56 KG/M2 | SYSTOLIC BLOOD PRESSURE: 120 MMHG | HEART RATE: 76 BPM

## 2025-03-20 DIAGNOSIS — K52.839 MICROSCOPIC COLITIS, UNSPECIFIED MICROSCOPIC COLITIS TYPE: ICD-10-CM

## 2025-03-20 DIAGNOSIS — Z00.00 ROUTINE GENERAL MEDICAL EXAMINATION AT HEALTH CARE FACILITY: Primary | ICD-10-CM

## 2025-03-20 DIAGNOSIS — E55.9 VITAMIN D INSUFFICIENCY: ICD-10-CM

## 2025-03-20 DIAGNOSIS — E78.49 HYPERLIPIDEMIA, FAMILIAL, HIGH LDL: ICD-10-CM

## 2025-03-20 DIAGNOSIS — Z12.31 ENCOUNTER FOR SCREENING MAMMOGRAM FOR BREAST CANCER: ICD-10-CM

## 2025-03-20 DIAGNOSIS — Z23 NEED FOR VACCINATION: ICD-10-CM

## 2025-03-20 DIAGNOSIS — Z78.0 ASYMPTOMATIC MENOPAUSAL STATE: ICD-10-CM

## 2025-03-20 DIAGNOSIS — Z13.220 SCREENING FOR HYPERLIPIDEMIA: ICD-10-CM

## 2025-03-20 PROCEDURE — 1159F MED LIST DOCD IN RCRD: CPT | Performed by: FAMILY MEDICINE

## 2025-03-20 PROCEDURE — G0439 PPPS, SUBSEQ VISIT: HCPCS | Performed by: FAMILY MEDICINE

## 2025-03-20 PROCEDURE — 1157F ADVNC CARE PLAN IN RCRD: CPT | Performed by: FAMILY MEDICINE

## 2025-03-20 PROCEDURE — 1123F ACP DISCUSS/DSCN MKR DOCD: CPT | Performed by: FAMILY MEDICINE

## 2025-03-20 PROCEDURE — 1036F TOBACCO NON-USER: CPT | Performed by: FAMILY MEDICINE

## 2025-03-20 PROCEDURE — 1158F ADVNC CARE PLAN TLK DOCD: CPT | Performed by: FAMILY MEDICINE

## 2025-03-20 PROCEDURE — 1170F FXNL STATUS ASSESSED: CPT | Performed by: FAMILY MEDICINE

## 2025-03-20 PROCEDURE — 1160F RVW MEDS BY RX/DR IN RCRD: CPT | Performed by: FAMILY MEDICINE

## 2025-03-20 PROCEDURE — 3008F BODY MASS INDEX DOCD: CPT | Performed by: FAMILY MEDICINE

## 2025-03-20 ASSESSMENT — ACTIVITIES OF DAILY LIVING (ADL)
GROCERY_SHOPPING: INDEPENDENT
TAKING_MEDICATION: INDEPENDENT
DRESSING: INDEPENDENT
DOING_HOUSEWORK: INDEPENDENT
BATHING: INDEPENDENT
MANAGING_FINANCES: INDEPENDENT

## 2025-03-20 ASSESSMENT — PATIENT HEALTH QUESTIONNAIRE - PHQ9
1. LITTLE INTEREST OR PLEASURE IN DOING THINGS: NOT AT ALL
SUM OF ALL RESPONSES TO PHQ9 QUESTIONS 1 AND 2: 1
2. FEELING DOWN, DEPRESSED OR HOPELESS: SEVERAL DAYS

## 2025-03-20 ASSESSMENT — ENCOUNTER SYMPTOMS
APPETITE CHANGE: 0
DIZZINESS: 0
UNEXPECTED WEIGHT CHANGE: 1
CHEST TIGHTNESS: 0
FATIGUE: 0
HEADACHES: 0
CONSTIPATION: 0
SHORTNESS OF BREATH: 0
DIARRHEA: 1
LIGHT-HEADEDNESS: 0
PALPITATIONS: 0
ACTIVITY CHANGE: 0

## 2025-03-20 NOTE — ASSESSMENT & PLAN NOTE
Continue to follow with GI  Orders:    CBC and Auto Differential; Future    Comprehensive metabolic panel; Future    Vitamin B12; Future    Vitamin D 25-Hydroxy,Total (for eval of Vitamin D levels); Future

## 2025-03-20 NOTE — PROGRESS NOTES
Subjective   Reason for Visit: Marcia Steinberg is an 70 y.o. female here for a Medicare Wellness visit.     Past Medical, Surgical, and Family History reviewed and updated in chart.    Reviewed all medications by prescribing practitioner or clinical pharmacist (such as prescriptions, OTCs, herbal therapies and supplements) and documented in the medical record.  Medicare Wellness Billing Compliance Satisfied    *This is a visual tool to show completion of required items on the day of the visit. Green checks will only appear on the date of visit.    Review all medications by prescribing practitioner or clinical pharmacist (such as prescriptions, OTCs, herbal therapies and supplements) documented in the medical record    Past Medical, Surgical, and Family History reviewed and updated in chart    Tobacco Use Reviewed    Alcohol Use Reviewed    Illicit Drug Use Reviewed    PHQ2/9    Falls in Last Year Reviewed    Home Safety Risk Factors Reviewed    Cognitive Impairment Reviewed    Patient Self Assessment and Health Status    Current Diet Reviewed    Exercise Frequency    ADL - Hearing Impairment    ADL - Bathing    ADL - Dressing    ADL - Walks in Home    IADL - Managing Finances    IADL - Grocery Shopping    IADL - Taking Medications    IADL - Doing Housework      HPI  Nutrition: tries to maintain overall balanced diet, has to monitor choices to avoid triggers that flare colitis.  Exercise: Regularly cares for grandchildren  Sleep: Overall feels rested  Eye: Up-to-date  Dental: Up-to-date    Continues to struggle to maintain weight due to colitis.  Following with GI.    Patient Care Team:  Kaila Boo MD as PCP - General     Review of Systems   Constitutional:  Positive for unexpected weight change. Negative for activity change, appetite change and fatigue.   Respiratory:  Negative for chest tightness and shortness of breath.    Cardiovascular:  Negative for chest pain and palpitations.  "  Gastrointestinal:  Positive for diarrhea. Negative for constipation.   Genitourinary:  Negative for pelvic pain.   Neurological:  Negative for dizziness, light-headedness and headaches.       Objective   Vitals:  /68 (BP Location: Left arm, Patient Position: Sitting)   Pulse 76   Temp 36.1 °C (96.9 °F)   Ht 1.626 m (5' 4\")   Wt (!) 44 kg (97 lb)   BMI 16.65 kg/m²       Physical Exam  Vitals reviewed.   Constitutional:       General: She is not in acute distress.     Appearance: Normal appearance. She is underweight.   HENT:      Head: Normocephalic and atraumatic.      Right Ear: Tympanic membrane and ear canal normal.      Left Ear: Tympanic membrane and ear canal normal.      Nose: Nose normal. No congestion or rhinorrhea.      Mouth/Throat:      Mouth: Mucous membranes are moist.      Pharynx: Oropharynx is clear.   Eyes:      Extraocular Movements: Extraocular movements intact.      Conjunctiva/sclera: Conjunctivae normal.      Pupils: Pupils are equal, round, and reactive to light.   Cardiovascular:      Rate and Rhythm: Normal rate and regular rhythm.      Pulses: Normal pulses.      Heart sounds: Normal heart sounds. No murmur heard.  Pulmonary:      Effort: Pulmonary effort is normal. No respiratory distress.      Breath sounds: Normal breath sounds.   Abdominal:      General: Abdomen is flat. Bowel sounds are normal.      Palpations: Abdomen is soft.      Tenderness: There is no abdominal tenderness.   Musculoskeletal:         General: Normal range of motion.      Cervical back: Normal range of motion and neck supple.      Right lower leg: No edema.      Left lower leg: No edema.   Lymphadenopathy:      Cervical: No cervical adenopathy.   Skin:     General: Skin is warm and dry.      Comments: Healing biopsy site at bridge of nose   Neurological:      General: No focal deficit present.      Mental Status: She is alert and oriented to person, place, and time.      Cranial Nerves: No cranial " nerve deficit.      Gait: Gait normal.   Psychiatric:         Mood and Affect: Mood normal.         Behavior: Behavior normal.         Thought Content: Thought content normal.         Judgment: Judgment normal.         Assessment & Plan  Routine general medical examination at health care facility  Discussed strategies to help improve caloric intake  Continue regular physical activity, try to incorporate strength and resistance training  Proceed with lab work as ordered  Reviewed recommended vaccines.  Discussed seasonal flu vaccine and COVID boosters.  Orders:    1 Year Follow Up In Primary Care - Wellness Exam; Future    BMI less than 19,adult    Orders:    CBC and Auto Differential; Future    Comprehensive metabolic panel; Future    Vitamin B12; Future    Vitamin D 25-Hydroxy,Total (for eval of Vitamin D levels); Future    Need for vaccination    Orders:    diphth,pertus,acell,,tetanus (BoostRIX) 2.5-8-5 Lf-mcg-Lf/0.5mL injection; Inject 0.5 mL into the muscle 1 time for 1 dose.    Asymptomatic menopausal state  Proceed with bone density testing is high risk for osteoporosis given low body weight  Orders:    XR DEXA bone density; Future    Encounter for screening mammogram for breast cancer    Orders:    BI mammo bilateral screening tomosynthesis; Future    Screening for hyperlipidemia    Orders:    Lipid Panel; Future    Microscopic colitis, unspecified microscopic colitis type  Continue to follow with GI  Orders:    CBC and Auto Differential; Future    Comprehensive metabolic panel; Future    Vitamin B12; Future    Vitamin D 25-Hydroxy,Total (for eval of Vitamin D levels); Future    Vitamin D insufficiency    Orders:    Vitamin D 25-Hydroxy,Total (for eval of Vitamin D levels); Future    Hyperlipidemia, familial, high LDL  Recheck lipids to reassess CVD risk

## 2025-05-15 ENCOUNTER — APPOINTMENT (OUTPATIENT)
Dept: BEHAVIORAL HEALTH | Facility: CLINIC | Age: 70
End: 2025-05-15
Payer: MEDICARE

## 2025-05-15 DIAGNOSIS — F41.1 GAD (GENERALIZED ANXIETY DISORDER): ICD-10-CM

## 2025-05-15 PROCEDURE — 1160F RVW MEDS BY RX/DR IN RCRD: CPT | Performed by: CLINICAL NURSE SPECIALIST

## 2025-05-15 PROCEDURE — 1159F MED LIST DOCD IN RCRD: CPT | Performed by: CLINICAL NURSE SPECIALIST

## 2025-05-15 PROCEDURE — 99212 OFFICE O/P EST SF 10 MIN: CPT | Performed by: CLINICAL NURSE SPECIALIST

## 2025-05-15 RX ORDER — DIAZEPAM 5 MG/1
TABLET ORAL
Qty: 30 TABLET | Refills: 2 | Status: SHIPPED | OUTPATIENT
Start: 2025-05-15

## 2025-05-15 NOTE — PROGRESS NOTES
Outpatient Psychiatry      Subjective   Stephany Bedolla, a 70 y.o. female, presents  for an audio and video virtual appointment as an established patient for outpatient psychiatry /medication management    Virtual or Telephone Consent    An interactive audio and video telecommunication system which permits real time communications between the patient (at the originating site) and provider (at the distant site) was utilized to provide this telehealth service.   Verbal consent was requested and obtained from Stephany Bedolla on this date, 5/15/25 for a telehealth visit and the patient's location was confirmed at the time of the visit.        Diagnoses :  Generalized anxiety disorder F41.1 stable      Treatment Plan/Recommendations: 10-12 weeks follow up , can continue self care and wellness efforts and maintain routine health screenings , can call  for treatment and scheduling concerns   Follow-up plan was discussed with patient.  Psychotropic medication :  Continue Diazepam 5 mg , 1 tablet each evening for anxiety   Continue Escitalopram 10 mg , daily for anxiety      Review with patient: Treatment plan reviewed with the patient.  Medication risks/benefit reviewed with the patient     HPI:  patient reports anxiety is well managed , no longer having panic attacks   No new health issues    she reports with taking diazepam she is able to feel rested and gets some sleep   denies any substance abuse concerns , she does not use any illicit drugs , no alcohol, no cbd or thc products used   no psychoses   thoughts are organized   there are no ocd like behaviors   she does not have any paranoid or delusional thoughts , she is not having any recent hallucinations   no history of koko or hypomania   she stays active , her roger is important to her  No concerns with falls   No concerns with cognitive functioning      I have personally reviewed the OARRS report for STEPHANY BEDOLLA. I have considered the risks of  abuse, dependence, addiction and diversion.   I have the following concerns: no concerns on oarrs.   Is the patient prescribed a combination of a benzodiazepine and opioid? No.   Last urine drug screening results as expected , no concerns 9/5/24    Date of the last Controlled Substance Agreement: 3/4/25  BENZODIAZEPINES   What is the patient's goal of therapy? to treat tay and manage intense anxiety.  Is this being achieved with current treatment? yes , she reports improved anxiety.    it is my opinion that this patient is benefitting from benzodiazepine therapy.   Physical functioning: Better   Family relationships: Better   Social relationships: Better   Mood: Better   Sleep patterns: Better   Overall functioning: Better   Current or Past Use of Non-Controlled Medication: Serotonin Reuptake Inhibitor (SSRI).     Ros psych and medical as noted above      Social History     Socioeconomic History    Marital status:      Spouse name: Not on file    Number of children: Not on file    Years of education: Not on file    Highest education level: Not on file   Occupational History    Not on file   Tobacco Use    Smoking status: Never    Smokeless tobacco: Never   Vaping Use    Vaping status: Never Used   Substance and Sexual Activity    Alcohol use: Never    Drug use: Never    Sexual activity: Not on file   Other Topics Concern    Not on file   Social History Narrative    Not on file     Social Drivers of Health     Financial Resource Strain: Not on file   Food Insecurity: Not on file   Transportation Needs: Not on file   Physical Activity: Not on file   Stress: Not on file   Social Connections: Not on file   Intimate Partner Violence: Not on file   Housing Stability: Not on file     Vitals:  There were no vitals filed for this visit.    Bonita Angulo, APRN-CNS

## 2025-08-01 ENCOUNTER — DOCUMENTATION (OUTPATIENT)
Dept: BEHAVIORAL HEALTH | Facility: CLINIC | Age: 70
End: 2025-08-01
Payer: MEDICARE

## 2025-08-01 RX ORDER — ESCITALOPRAM OXALATE 10 MG/1
20 TABLET ORAL DAILY
COMMUNITY

## 2025-08-01 NOTE — PROGRESS NOTES
Nonbillable note : communicated with patient on [facility] whom reached out about increased anxiety and asking about increasing dose of escitalopram to 20 mg , daily . Discussed increased dose and reconciled med list in the [facility] em[facility]. kpacer cns

## 2025-08-02 ENCOUNTER — DOCUMENTATION (OUTPATIENT)
Dept: BEHAVIORAL HEALTH | Facility: CLINIC | Age: 70
End: 2025-08-02
Payer: MEDICARE

## 2025-08-02 NOTE — PROGRESS NOTES
Nonbillable note : communicated with patient on my chart [facility] em[facility] , whom reached out on [facility] my chart system about asking about taking a higher dose of escitalopram for increased anxiety . I am in agreement with this . She has an appointment in a few weeks . [facility] cns

## 2025-08-14 ENCOUNTER — APPOINTMENT (OUTPATIENT)
Dept: BEHAVIORAL HEALTH | Facility: CLINIC | Age: 70
End: 2025-08-14
Payer: MEDICARE

## 2025-08-14 DIAGNOSIS — F41.1 GAD (GENERALIZED ANXIETY DISORDER): Primary | ICD-10-CM

## 2025-08-14 DIAGNOSIS — F51.01 PRIMARY INSOMNIA: ICD-10-CM

## 2025-08-14 PROCEDURE — 99213 OFFICE O/P EST LOW 20 MIN: CPT | Performed by: CLINICAL NURSE SPECIALIST

## 2025-08-14 PROCEDURE — 1159F MED LIST DOCD IN RCRD: CPT | Performed by: CLINICAL NURSE SPECIALIST

## 2025-08-14 PROCEDURE — 1160F RVW MEDS BY RX/DR IN RCRD: CPT | Performed by: CLINICAL NURSE SPECIALIST

## 2025-08-14 PROCEDURE — 1036F TOBACCO NON-USER: CPT | Performed by: CLINICAL NURSE SPECIALIST

## 2025-08-14 RX ORDER — DIAZEPAM 5 MG/1
7.5 TABLET ORAL DAILY
Qty: 45 TABLET | Refills: 2 | Status: SHIPPED | OUTPATIENT
Start: 2025-08-14 | End: 2025-09-13

## 2025-08-14 RX ORDER — ESCITALOPRAM OXALATE 20 MG/1
20 TABLET ORAL DAILY
Qty: 90 TABLET | Refills: 0 | Status: SHIPPED | OUTPATIENT
Start: 2025-08-14 | End: 2025-11-12

## 2025-08-25 PROBLEM — F41.1 GAD (GENERALIZED ANXIETY DISORDER): Status: ACTIVE | Noted: 2025-08-25

## 2026-03-20 ENCOUNTER — APPOINTMENT (OUTPATIENT)
Dept: PRIMARY CARE | Facility: CLINIC | Age: 71
End: 2026-03-20
Payer: MEDICARE